# Patient Record
Sex: MALE | Race: WHITE | Employment: FULL TIME | URBAN - METROPOLITAN AREA
[De-identification: names, ages, dates, MRNs, and addresses within clinical notes are randomized per-mention and may not be internally consistent; named-entity substitution may affect disease eponyms.]

---

## 2020-06-11 ENCOUNTER — HOSPITAL ENCOUNTER (EMERGENCY)
Facility: CLINIC | Age: 65
Discharge: HOME | End: 2020-06-11
Attending: EMERGENCY MEDICINE | Admitting: EMERGENCY MEDICINE
Payer: COMMERCIAL

## 2020-06-11 ENCOUNTER — APPOINTMENT (OUTPATIENT)
Dept: CT IMAGING | Facility: CLINIC | Age: 65
End: 2020-06-11
Attending: EMERGENCY MEDICINE
Payer: COMMERCIAL

## 2020-06-11 VITALS
TEMPERATURE: 97.7 F | SYSTOLIC BLOOD PRESSURE: 138 MMHG | RESPIRATION RATE: 24 BRPM | WEIGHT: 266.87 LBS | DIASTOLIC BLOOD PRESSURE: 78 MMHG | OXYGEN SATURATION: 99 %

## 2020-06-11 DIAGNOSIS — N23 RENAL COLIC: ICD-10-CM

## 2020-06-11 DIAGNOSIS — K80.20 CALCULUS OF GALLBLADDER WITHOUT CHOLECYSTITIS WITHOUT OBSTRUCTION: ICD-10-CM

## 2020-06-11 LAB
ALBUMIN UR-MCNC: 30 MG/DL
ANION GAP SERPL CALCULATED.3IONS-SCNC: 11 MMOL/L (ref 3–14)
APPEARANCE UR: CLEAR
BACTERIA #/AREA URNS HPF: ABNORMAL /HPF
BILIRUB UR QL STRIP: NEGATIVE
BUN SERPL-MCNC: 23 MG/DL (ref 7–30)
CALCIUM SERPL-MCNC: 9.3 MG/DL (ref 8.5–10.1)
CAOX CRY #/AREA URNS HPF: ABNORMAL /HPF
CHLORIDE SERPL-SCNC: 108 MMOL/L (ref 94–109)
CO2 SERPL-SCNC: 20 MMOL/L (ref 20–32)
COLOR UR AUTO: YELLOW
CREAT SERPL-MCNC: 1.23 MG/DL (ref 0.66–1.25)
GFR SERPL CREATININE-BSD FRML MDRD: 62 ML/MIN/{1.73_M2}
GLUCOSE SERPL-MCNC: 124 MG/DL (ref 70–99)
GLUCOSE UR STRIP-MCNC: NEGATIVE MG/DL
HGB UR QL STRIP: ABNORMAL
KETONES UR STRIP-MCNC: NEGATIVE MG/DL
LEUKOCYTE ESTERASE UR QL STRIP: ABNORMAL
MUCOUS THREADS #/AREA URNS LPF: PRESENT /LPF
NITRATE UR QL: NEGATIVE
PH UR STRIP: 5.5 PH (ref 5–7)
POTASSIUM SERPL-SCNC: 4.1 MMOL/L (ref 3.4–5.3)
RBC #/AREA URNS AUTO: 31 /HPF (ref 0–2)
SODIUM SERPL-SCNC: 139 MMOL/L (ref 133–144)
SOURCE: ABNORMAL
SP GR UR STRIP: 1.03 (ref 1–1.03)
SQUAMOUS #/AREA URNS AUTO: <1 /HPF (ref 0–1)
UROBILINOGEN UR STRIP-MCNC: NORMAL MG/DL (ref 0–2)
WBC #/AREA URNS AUTO: 7 /HPF (ref 0–5)

## 2020-06-11 PROCEDURE — 81001 URINALYSIS AUTO W/SCOPE: CPT | Performed by: EMERGENCY MEDICINE

## 2020-06-11 PROCEDURE — 80048 BASIC METABOLIC PNL TOTAL CA: CPT | Performed by: EMERGENCY MEDICINE

## 2020-06-11 PROCEDURE — 25000128 H RX IP 250 OP 636: Performed by: EMERGENCY MEDICINE

## 2020-06-11 PROCEDURE — 99285 EMERGENCY DEPT VISIT HI MDM: CPT | Mod: 25

## 2020-06-11 PROCEDURE — 96375 TX/PRO/DX INJ NEW DRUG ADDON: CPT

## 2020-06-11 PROCEDURE — 96374 THER/PROPH/DIAG INJ IV PUSH: CPT

## 2020-06-11 PROCEDURE — 74176 CT ABD & PELVIS W/O CONTRAST: CPT

## 2020-06-11 PROCEDURE — 25000132 ZZH RX MED GY IP 250 OP 250 PS 637: Performed by: EMERGENCY MEDICINE

## 2020-06-11 RX ORDER — ONDANSETRON 4 MG/1
4 TABLET, ORALLY DISINTEGRATING ORAL EVERY 8 HOURS PRN
Qty: 10 TABLET | Refills: 0 | Status: SHIPPED | OUTPATIENT
Start: 2020-06-11 | End: 2020-06-15

## 2020-06-11 RX ORDER — TAMSULOSIN HYDROCHLORIDE 0.4 MG/1
0.4 CAPSULE ORAL DAILY
Qty: 10 CAPSULE | Refills: 0 | Status: SHIPPED | OUTPATIENT
Start: 2020-06-11 | End: 2020-06-21

## 2020-06-11 RX ORDER — METOCLOPRAMIDE HYDROCHLORIDE 5 MG/ML
10 INJECTION INTRAMUSCULAR; INTRAVENOUS EVERY 4 HOURS PRN
Status: DISCONTINUED | OUTPATIENT
Start: 2020-06-11 | End: 2020-06-11 | Stop reason: HOSPADM

## 2020-06-11 RX ORDER — HYDROCODONE BITARTRATE AND ACETAMINOPHEN 5; 325 MG/1; MG/1
1 TABLET ORAL EVERY 6 HOURS PRN
Qty: 10 TABLET | Refills: 0 | Status: ON HOLD | OUTPATIENT
Start: 2020-06-11 | End: 2020-06-14

## 2020-06-11 RX ORDER — TAMSULOSIN HYDROCHLORIDE 0.4 MG/1
0.4 CAPSULE ORAL ONCE
Status: COMPLETED | OUTPATIENT
Start: 2020-06-11 | End: 2020-06-11

## 2020-06-11 RX ORDER — KETOROLAC TROMETHAMINE 15 MG/ML
15 INJECTION, SOLUTION INTRAMUSCULAR; INTRAVENOUS ONCE
Status: COMPLETED | OUTPATIENT
Start: 2020-06-11 | End: 2020-06-11

## 2020-06-11 RX ADMIN — TAMSULOSIN HYDROCHLORIDE 0.4 MG: 0.4 CAPSULE ORAL at 11:44

## 2020-06-11 RX ADMIN — KETOROLAC TROMETHAMINE 15 MG: 15 INJECTION, SOLUTION INTRAMUSCULAR; INTRAVENOUS at 11:06

## 2020-06-11 RX ADMIN — METOCLOPRAMIDE HYDROCHLORIDE 10 MG: 5 INJECTION INTRAMUSCULAR; INTRAVENOUS at 11:06

## 2020-06-11 ASSESSMENT — ENCOUNTER SYMPTOMS
ABDOMINAL PAIN: 0
FEVER: 0
DIARRHEA: 0
VOMITING: 0
CHILLS: 0
CONSTIPATION: 0
NAUSEA: 1
DYSURIA: 0
HEMATURIA: 0

## 2020-06-11 NOTE — ED PROVIDER NOTES
"History     Chief Complaint:  Flank Pain    HPI  Sammy Pope is a 65 year old male who presents for evaluation of left flank pain. He reports intermittent pain here over the last week and now today in the last 30 minutes his pain significantly increased feeling like \"someone is sticking a knife in and twisting it\". When it worsened this morning he also felt pain in his left testicle which has continued. Currently his pain is down to a 3/10 after this 30 minute episode has ended. He feels better standing than sitting and notes a heat pad helps. He took Aleve this morning at 0700 with minimal pain relief. The patient also reports nausea but no vomiting. He denies any urinary urgency, frequency, hematuria, or dysuria. He also denies any fever, chills, or any changes in bowel movements. He has not been doing any new physical activities and denies trauma to the area. There is no family history of kidney stones.       Allergies:  No known drug allergies    Medications:    Lipitor     Past Medical History:    HLD    Past Surgical History:    History reviewed. No pertinent surgical history.    Family History:    History reviewed. No pertinent family history.     Social History:  The patient arrives alone      Review of Systems   Constitutional: Negative for chills and fever.   Gastrointestinal: Positive for nausea. Negative for abdominal pain, constipation, diarrhea and vomiting.   Genitourinary: Positive for testicular pain. Negative for dysuria and hematuria.   All other systems reviewed and are negative.      Physical Exam     Patient Vitals for the past 24 hrs:   BP Temp Temp src Heart Rate Resp SpO2 Weight   06/11/20 1039 (!) 197/91 97.7  F (36.5  C) Oral 62 24 97 % 121.1 kg (266 lb 13.9 oz)     Physical Exam  General: Alert, no acute distress; nontoxic appearing  HEENT:  Moist mucous membranes.  Conjunctiva normal.   CV:  RRR, no m/r/g, skin warm and well perfused  Pulm:  CTAB, no wheezes/ronchi/rales.  No acute " distress, breathing comfortably  GI:  Soft, nontender, nondistended.  No rebound or guarding.  Normal bowel sounds  :  Normal appearing external genitalia.  No scrotal swelling, no testicular pain/tenderness.  No hernia.  MSK:  Moving all extremities.  No focal areas of edema, erythema, or tenderness; no CVA tenderness  Skin:  WWP, no rashes, no lower extremity edema, skin color normal, no diaphoresis  Psych:  Well-appearing, normal affect, regular speech    Emergency Department Course     Imaging:  Radiology findings were communicated with the patient who voiced understanding of the findings.    CT Abdomen Pelvis wo contrast   IMPRESSION:   1.  3-4 mm proximal left ureteral calculus with mild left   hydronephrosis.   2.  Cholelithiasis  Reading per radiology    Laboratory:  Laboratory findings were communicated with the patient who voiced understanding of the findings.    BMP: Glucose 124 (H), o/w WNL (Creatinine: 1.23)    UA: blood small, protein albumin 30, leukocyte trace, WBC/HPF 7 (H), RBC/HPF 31 (H), bacteria few, mucus present, calcium oxalate few, o/w Negative    Interventions:  1106 Toradol, 15 mg, IV  1106 Reglan 10 mg IV  1144 Flomax 0.4 mg PO    Emergency Department Course:  Past medical records, nursing notes, and vitals reviewed.  1043: I performed an exam of the patient and obtained history, as documented above.     IV was inserted and blood was drawn for laboratory testing, results above.    The patient was sent for a CT while in the emergency department, findings above    The patient provided a urine sample here in the emergency department. This was sent for laboratory testing, findings above.    1215: I rechecked the patient. Explained findings to patient who states his pain is now down to a 1/10.    I personally reviewed the laboratory and imaging results with the Patient and answered all related questions prior to discharge.     Findings and plan explained to the Patient. Patient discharged  home with instructions regarding supportive care, medications, and reasons to return. The importance of close follow-up was reviewed.   Impression & Plan      Medical Decision Making:  Sammy Pope is a 65 year old male who presents with left flank pain. A broad differential diagnosis was considered including diverticulitis, ureterolithiasis, cholecystitis, aneurysm, dissection, volvulus, appendicitis, splenic issue, stomach pathology, ulcer, hydronephrosis, pneumonia, UTI, pyelonephritis amongst many other etiologies.   Signs and symptoms consistent with ureterolithiasis and renal colic. Confirmed on CT imaging with 3-4 mm stone.  UA not concerning for concomitant UTI.  Patients pain is controlled in ED and given flomax.  No signs of infected stone. There is the incidental finding of gallstones but no signs this is symptomatic at this time. Patient is hemodynamically stable in ED. Plan is home with the medicines below, follow up with urology as outpatient.  Return for fevers greater than 102, persistent anincreasing pain, other new symptoms develop.  Ureterolithiasis precautions for home.  Questions were answered.     Diagnosis:    ICD-10-CM    1. Renal colic  N23 UA with Microscopic     Basic metabolic panel (BMP)   2. Calculus of gallbladder without cholecystitis without obstruction  K80.20        Disposition:   discharged to home    Discharge Medications:     Medication List      Started    HYDROcodone-acetaminophen 5-325 MG tablet  Commonly known as:  NORCO  1 tablet, Oral, EVERY 6 HOURS PRN     ondansetron 4 MG ODT tab  Commonly known as:  Zofran ODT  4 mg, Oral, EVERY 8 HOURS PRN     tamsulosin 0.4 MG capsule  Commonly known as:  Flomax  0.4 mg, Oral, DAILY            Scribe Disclosure:  Jenny JEONG, am serving as a scribe at 10:43 AM on 6/11/2020 to document services personally performed by Felix Odonnell MD based on my observations and the provider's statements to me.    [facility]  [facility] EMERGENCY DEPARTMENT     [facility], Felix Acosta MD  06/11/20 0979

## 2020-06-11 NOTE — ED TRIAGE NOTES
Left flank pain that began intermittently or 1 week and worsening today.  Nauseated but no vomiting.

## 2020-06-11 NOTE — DISCHARGE INSTRUCTIONS
Return to the ER if you develop fever, worsening nausea/vomiting, worsening pain or any new concerning symptom.

## 2020-06-11 NOTE — ED AVS SNAPSHOT
[facility] Emergency Department  [address]  [facility] [address]  Phone:  [phone]  Fax:  [phone]                                    Sammy Pope   MRN: [id number]    Department:  [facility] Emergency Department   Date of Visit:  6/11/2020           After Visit Summary Signature Page    I have received my discharge instructions, and my questions have been answered. I have discussed any challenges I see with this plan with the nurse or doctor.    ..........................................................................................................................................  Patient/Patient Representative Signature      ..........................................................................................................................................  Patient Representative Print Name and Relationship to Patient    ..................................................               ................................................  Date                                   Time    ..........................................................................................................................................  Reviewed by Signature/Title    ...................................................              ..............................................  Date                                               Time          22EPIC Rev 08/18

## 2020-06-13 ENCOUNTER — HOSPITAL ENCOUNTER (OUTPATIENT)
Facility: CLINIC | Age: 65
Setting detail: OBSERVATION
Discharge: HOME | End: 2020-06-14
Attending: EMERGENCY MEDICINE | Admitting: INTERNAL MEDICINE
Payer: COMMERCIAL

## 2020-06-13 ENCOUNTER — APPOINTMENT (OUTPATIENT)
Dept: GENERAL RADIOLOGY | Facility: CLINIC | Age: 65
End: 2020-06-13
Attending: EMERGENCY MEDICINE
Payer: COMMERCIAL

## 2020-06-13 DIAGNOSIS — N17.9 ACUTE KIDNEY INJURY (H): ICD-10-CM

## 2020-06-13 DIAGNOSIS — N20.1 URETERAL STONE: Primary | ICD-10-CM

## 2020-06-13 PROBLEM — N23 RENAL COLIC: Status: ACTIVE | Noted: 2020-06-13

## 2020-06-13 LAB
ALBUMIN UR-MCNC: NEGATIVE MG/DL
ANION GAP SERPL CALCULATED.3IONS-SCNC: 10 MMOL/L (ref 3–14)
APPEARANCE UR: CLEAR
BASOPHILS # BLD AUTO: 0 10E9/L (ref 0–0.2)
BASOPHILS NFR BLD AUTO: 0.2 %
BILIRUB UR QL STRIP: NEGATIVE
BUN SERPL-MCNC: 20 MG/DL (ref 7–30)
CALCIUM SERPL-MCNC: 9.1 MG/DL (ref 8.5–10.1)
CHLORIDE SERPL-SCNC: 104 MMOL/L (ref 94–109)
CO2 SERPL-SCNC: 23 MMOL/L (ref 20–32)
COLOR UR AUTO: ABNORMAL
CREAT SERPL-MCNC: 1.85 MG/DL (ref 0.66–1.25)
DIFFERENTIAL METHOD BLD: ABNORMAL
EOSINOPHIL # BLD AUTO: 0 10E9/L (ref 0–0.7)
EOSINOPHIL NFR BLD AUTO: 0.5 %
ERYTHROCYTE [DISTWIDTH] IN BLOOD BY AUTOMATED COUNT: 12.1 % (ref 10–15)
GFR SERPL CREATININE-BSD FRML MDRD: 38 ML/MIN/{1.73_M2}
GLUCOSE SERPL-MCNC: 103 MG/DL (ref 70–99)
GLUCOSE UR STRIP-MCNC: NEGATIVE MG/DL
HCT VFR BLD AUTO: 39.3 % (ref 40–53)
HGB BLD-MCNC: 13 G/DL (ref 13.3–17.7)
HGB UR QL STRIP: NEGATIVE
IMM GRANULOCYTES # BLD: 0 10E9/L (ref 0–0.4)
IMM GRANULOCYTES NFR BLD: 0.2 %
KETONES UR STRIP-MCNC: NEGATIVE MG/DL
LEUKOCYTE ESTERASE UR QL STRIP: NEGATIVE
LYMPHOCYTES # BLD AUTO: 1.2 10E9/L (ref 0.8–5.3)
LYMPHOCYTES NFR BLD AUTO: 13.2 %
MCH RBC QN AUTO: 29.4 PG (ref 26.5–33)
MCHC RBC AUTO-ENTMCNC: 33.1 G/DL (ref 31.5–36.5)
MCV RBC AUTO: 89 FL (ref 78–100)
MONOCYTES # BLD AUTO: 0.9 10E9/L (ref 0–1.3)
MONOCYTES NFR BLD AUTO: 9.7 %
MUCOUS THREADS #/AREA URNS LPF: PRESENT /LPF
NEUTROPHILS # BLD AUTO: 6.7 10E9/L (ref 1.6–8.3)
NEUTROPHILS NFR BLD AUTO: 76.2 %
NITRATE UR QL: NEGATIVE
NRBC # BLD AUTO: 0 10*3/UL
NRBC BLD AUTO-RTO: 0 /100
PH UR STRIP: 5.5 PH (ref 5–7)
PLATELET # BLD AUTO: 203 10E9/L (ref 150–450)
POTASSIUM SERPL-SCNC: 4 MMOL/L (ref 3.4–5.3)
RBC # BLD AUTO: 4.42 10E12/L (ref 4.4–5.9)
RBC #/AREA URNS AUTO: 0 /HPF (ref 0–2)
SARS-COV-2 PCR COMMENT: NORMAL
SARS-COV-2 RNA SPEC QL NAA+PROBE: NEGATIVE
SARS-COV-2 RNA SPEC QL NAA+PROBE: NORMAL
SODIUM SERPL-SCNC: 137 MMOL/L (ref 133–144)
SOURCE: ABNORMAL
SP GR UR STRIP: 1.02 (ref 1–1.03)
SPECIMEN SOURCE: NORMAL
SPECIMEN SOURCE: NORMAL
UROBILINOGEN UR STRIP-MCNC: NORMAL MG/DL (ref 0–2)
WBC # BLD AUTO: 8.8 10E9/L (ref 4–11)
WBC #/AREA URNS AUTO: 2 /HPF (ref 0–5)

## 2020-06-13 PROCEDURE — 96361 HYDRATE IV INFUSION ADD-ON: CPT

## 2020-06-13 PROCEDURE — 96375 TX/PRO/DX INJ NEW DRUG ADDON: CPT

## 2020-06-13 PROCEDURE — 74019 RADEX ABDOMEN 2 VIEWS: CPT

## 2020-06-13 PROCEDURE — 80048 BASIC METABOLIC PNL TOTAL CA: CPT | Performed by: EMERGENCY MEDICINE

## 2020-06-13 PROCEDURE — 99220 ZZC INITIAL OBSERVATION CARE,LEVL III: CPT | Performed by: PHYSICIAN ASSISTANT

## 2020-06-13 PROCEDURE — 96374 THER/PROPH/DIAG INJ IV PUSH: CPT

## 2020-06-13 PROCEDURE — 25000128 H RX IP 250 OP 636: Performed by: EMERGENCY MEDICINE

## 2020-06-13 PROCEDURE — 96376 TX/PRO/DX INJ SAME DRUG ADON: CPT

## 2020-06-13 PROCEDURE — 81001 URINALYSIS AUTO W/SCOPE: CPT | Performed by: EMERGENCY MEDICINE

## 2020-06-13 PROCEDURE — 25000128 H RX IP 250 OP 636

## 2020-06-13 PROCEDURE — 25800030 ZZH RX IP 258 OP 636: Performed by: PHYSICIAN ASSISTANT

## 2020-06-13 PROCEDURE — C9803 HOPD COVID-19 SPEC COLLECT: HCPCS

## 2020-06-13 PROCEDURE — G0378 HOSPITAL OBSERVATION PER HR: HCPCS

## 2020-06-13 PROCEDURE — U0003 INFECTIOUS AGENT DETECTION BY NUCLEIC ACID (DNA OR RNA); SEVERE ACUTE RESPIRATORY SYNDROME CORONAVIRUS 2 (SARS-COV-2) (CORONAVIRUS DISEASE [COVID-19]), AMPLIFIED PROBE TECHNIQUE, MAKING USE OF HIGH THROUGHPUT TECHNOLOGIES AS DESCRIBED BY CMS-2020-01-R: HCPCS | Performed by: EMERGENCY MEDICINE

## 2020-06-13 PROCEDURE — 25000132 ZZH RX MED GY IP 250 OP 250 PS 637: Performed by: PHYSICIAN ASSISTANT

## 2020-06-13 PROCEDURE — 74420 UROGRAPHY RTRGR +-KUB: CPT | Mod: 26 | Performed by: UROLOGY

## 2020-06-13 PROCEDURE — 99285 EMERGENCY DEPT VISIT HI MDM: CPT | Mod: 25

## 2020-06-13 PROCEDURE — 25800030 ZZH RX IP 258 OP 636: Performed by: EMERGENCY MEDICINE

## 2020-06-13 PROCEDURE — 52356 CYSTO/URETERO W/LITHOTRIPSY: CPT | Mod: LT | Performed by: UROLOGY

## 2020-06-13 PROCEDURE — 85025 COMPLETE CBC W/AUTO DIFF WBC: CPT | Performed by: EMERGENCY MEDICINE

## 2020-06-13 RX ORDER — ACETAMINOPHEN 325 MG/1
650 TABLET ORAL EVERY 4 HOURS PRN
Status: DISCONTINUED | OUTPATIENT
Start: 2020-06-13 | End: 2020-06-14 | Stop reason: HOSPADM

## 2020-06-13 RX ORDER — HYDROMORPHONE HYDROCHLORIDE 1 MG/ML
0.5 INJECTION, SOLUTION INTRAMUSCULAR; INTRAVENOUS; SUBCUTANEOUS ONCE
Status: COMPLETED | OUTPATIENT
Start: 2020-06-13 | End: 2020-06-13

## 2020-06-13 RX ORDER — LIDOCAINE 40 MG/G
CREAM TOPICAL
Status: DISCONTINUED | OUTPATIENT
Start: 2020-06-13 | End: 2020-06-14 | Stop reason: HOSPADM

## 2020-06-13 RX ORDER — HYDROMORPHONE HYDROCHLORIDE 1 MG/ML
INJECTION, SOLUTION INTRAMUSCULAR; INTRAVENOUS; SUBCUTANEOUS
Status: COMPLETED
Start: 2020-06-13 | End: 2020-06-13

## 2020-06-13 RX ORDER — NALOXONE HYDROCHLORIDE 0.4 MG/ML
.1-.4 INJECTION, SOLUTION INTRAMUSCULAR; INTRAVENOUS; SUBCUTANEOUS
Status: DISCONTINUED | OUTPATIENT
Start: 2020-06-13 | End: 2020-06-14 | Stop reason: HOSPADM

## 2020-06-13 RX ORDER — HYDROMORPHONE HYDROCHLORIDE 1 MG/ML
0.5 INJECTION, SOLUTION INTRAMUSCULAR; INTRAVENOUS; SUBCUTANEOUS
Status: DISCONTINUED | OUTPATIENT
Start: 2020-06-13 | End: 2020-06-14 | Stop reason: HOSPADM

## 2020-06-13 RX ORDER — ATORVASTATIN CALCIUM 20 MG/1
20 TABLET, FILM COATED ORAL DAILY
COMMUNITY

## 2020-06-13 RX ORDER — MORPHINE SULFATE 4 MG/ML
4 INJECTION, SOLUTION INTRAMUSCULAR; INTRAVENOUS
Status: DISCONTINUED | OUTPATIENT
Start: 2020-06-13 | End: 2020-06-13

## 2020-06-13 RX ORDER — AMOXICILLIN 250 MG
1 CAPSULE ORAL 2 TIMES DAILY PRN
Status: DISCONTINUED | OUTPATIENT
Start: 2020-06-13 | End: 2020-06-14 | Stop reason: HOSPADM

## 2020-06-13 RX ORDER — TAMSULOSIN HYDROCHLORIDE 0.4 MG/1
0.4 CAPSULE ORAL DAILY
Status: DISCONTINUED | OUTPATIENT
Start: 2020-06-14 | End: 2020-06-14 | Stop reason: HOSPADM

## 2020-06-13 RX ORDER — ONDANSETRON 2 MG/ML
4 INJECTION INTRAMUSCULAR; INTRAVENOUS EVERY 30 MIN PRN
Status: DISCONTINUED | OUTPATIENT
Start: 2020-06-13 | End: 2020-06-13

## 2020-06-13 RX ORDER — HYDROMORPHONE HYDROCHLORIDE 1 MG/ML
0.5 INJECTION, SOLUTION INTRAMUSCULAR; INTRAVENOUS; SUBCUTANEOUS
Status: COMPLETED | OUTPATIENT
Start: 2020-06-13 | End: 2020-06-13

## 2020-06-13 RX ORDER — ONDANSETRON 4 MG/1
4 TABLET, ORALLY DISINTEGRATING ORAL EVERY 6 HOURS PRN
Status: DISCONTINUED | OUTPATIENT
Start: 2020-06-13 | End: 2020-06-14 | Stop reason: HOSPADM

## 2020-06-13 RX ORDER — SODIUM CHLORIDE 9 MG/ML
INJECTION, SOLUTION INTRAVENOUS CONTINUOUS
Status: DISCONTINUED | OUTPATIENT
Start: 2020-06-13 | End: 2020-06-14 | Stop reason: HOSPADM

## 2020-06-13 RX ORDER — CHLORAL HYDRATE 500 MG
1 CAPSULE ORAL 3 TIMES DAILY
COMMUNITY

## 2020-06-13 RX ORDER — ONDANSETRON 2 MG/ML
4 INJECTION INTRAMUSCULAR; INTRAVENOUS EVERY 6 HOURS PRN
Status: DISCONTINUED | OUTPATIENT
Start: 2020-06-13 | End: 2020-06-14 | Stop reason: HOSPADM

## 2020-06-13 RX ORDER — OXYCODONE HYDROCHLORIDE 5 MG/1
5-10 TABLET ORAL
Status: DISCONTINUED | OUTPATIENT
Start: 2020-06-13 | End: 2020-06-14 | Stop reason: HOSPADM

## 2020-06-13 RX ORDER — POLYETHYLENE GLYCOL 3350 17 G/17G
17 POWDER, FOR SOLUTION ORAL DAILY PRN
Status: DISCONTINUED | OUTPATIENT
Start: 2020-06-13 | End: 2020-06-14 | Stop reason: HOSPADM

## 2020-06-13 RX ORDER — AMOXICILLIN 250 MG
2 CAPSULE ORAL 2 TIMES DAILY PRN
Status: DISCONTINUED | OUTPATIENT
Start: 2020-06-13 | End: 2020-06-14 | Stop reason: HOSPADM

## 2020-06-13 RX ADMIN — ONDANSETRON 4 MG: 2 INJECTION INTRAMUSCULAR; INTRAVENOUS at 17:59

## 2020-06-13 RX ADMIN — MORPHINE SULFATE 4 MG: 4 INJECTION INTRAVENOUS at 17:59

## 2020-06-13 RX ADMIN — HYDROMORPHONE HYDROCHLORIDE 0.5 MG: 1 INJECTION, SOLUTION INTRAMUSCULAR; INTRAVENOUS; SUBCUTANEOUS at 20:25

## 2020-06-13 RX ADMIN — HYDROMORPHONE HYDROCHLORIDE 0.5 MG: 1 INJECTION, SOLUTION INTRAMUSCULAR; INTRAVENOUS; SUBCUTANEOUS at 19:47

## 2020-06-13 RX ADMIN — OXYCODONE HYDROCHLORIDE 5 MG: 5 TABLET ORAL at 23:36

## 2020-06-13 RX ADMIN — SODIUM CHLORIDE: 9 INJECTION, SOLUTION INTRAVENOUS at 21:53

## 2020-06-13 RX ADMIN — HYDROMORPHONE HYDROCHLORIDE 0.5 MG: 1 INJECTION, SOLUTION INTRAMUSCULAR; INTRAVENOUS; SUBCUTANEOUS at 18:29

## 2020-06-13 RX ADMIN — SODIUM CHLORIDE 1000 ML: 9 INJECTION, SOLUTION INTRAVENOUS at 17:59

## 2020-06-13 ASSESSMENT — ENCOUNTER SYMPTOMS
CARDIOVASCULAR NEGATIVE: 1
RESPIRATORY NEGATIVE: 1
FLANK PAIN: 1
GASTROINTESTINAL NEGATIVE: 1
DIFFICULTY URINATING: 0
CONSTITUTIONAL NEGATIVE: 1

## 2020-06-13 ASSESSMENT — MIFFLIN-ST. JEOR: SCORE: 2047.85

## 2020-06-13 NOTE — ED TRIAGE NOTES
Here for kidney stone on Thursday, went home with pain meds. Pain has gotten worse, and now ran out of pain meds. ABCs intact. A&Ox3

## 2020-06-13 NOTE — ED PROVIDER NOTES
"  History   Chief Complaint  Flank Pain    The history is provided by the patient.      Sammy Pope is a 65 year old male with a history of HLD who presents for evaluation of left flank pain consistent with the Kidney stone he was found to have two days ago here in the ED where he was dicharged with norco. He reports today that the pain has gotten worse and is out of pain medication. He thinks the pain has started to migrate slightly. He states that he tried to treat his pain with ibuprofen or Tylenol, however neither of these alleviated his pain. He denies fever or vomiting. He rates his pain 8-9/10. He denies difficulties urinating.    Allergies  No Known Drug allergies    Medications  Lipitor    Past Medical History  HLD    Past Surgical History  The patient does not have any pertinent past surgical history.    Family History  No past pertinent family history.    Social History  The patient denies tobacco or alcohol use.      Review of Systems   Constitutional: Negative.    HENT: Negative.    Respiratory: Negative.    Cardiovascular: Negative.    Gastrointestinal: Negative.    Genitourinary: Positive for flank pain. Negative for difficulty urinating.   10 point review of systems was obtained and negative other than mentioned above.    Physical Exam     Patient Vitals for the past 24 hrs:   BP Temp Temp src Pulse Resp SpO2 Height Weight   06/13/20 2156 (!) 154/75 -- -- 74 -- -- -- --   06/13/20 2114 (!) 182/80 97.4  F (36.3  C) Oral 78 20 91 % 1.829 m (6' 0.01\") 122 kg (268 lb 14.4 oz)   06/13/20 2031 -- -- -- -- -- 97 % -- --   06/13/20 1916 -- -- -- -- -- 97 % -- --   06/13/20 1915 -- -- -- -- -- 95 % -- --   06/13/20 1900 -- -- -- 67 -- 93 % -- --   06/13/20 1845 -- -- -- -- -- 92 % -- --   06/13/20 1830 -- -- -- -- -- 97 % -- --   06/13/20 1815 -- -- -- -- -- 99 % -- --   06/13/20 1800 (!) 164/77 -- -- 64 -- 94 % -- --   06/13/20 1730 (!) 169/92 97.5  F (36.4  C) -- 100 26 97 % -- --       Physical " Exam    General: Appears uncomfortable sitting in bed  Eyes:  The pupils are equal and round    Conjunctivae and sclerae are normal  ENT:    Moist mucous membranes  Neck:  Normal range of motion  CV:  Regular rate     Skin warm and well perfused   Resp:  Non labored breathing on room air  GI:  Abdomen is soft, there is no rigidity    No distension    No rebound tenderness     No abdominal tenderness  MS:  Normal muscular tone  Skin:  No rash or acute skin lesions noted  Neuro:   Awake, alert.      Speech is normal and fluent.    Face is symmetric.     Moves all extremities equally  Psych: Normal affect.  Appropriate interactions.    Emergency Department Course   Imaging:  Radiology findings were communicated with the patient who voiced understanding of the findings.    KUB XR  FIDNINGS: A proximal left ureteral stone is not visualized. There are several calcifications within the pelvis most of which represent phleboliths. A 2 mm calcification lower left pelvis could represent migration of the previously detected left ureteral stone. Moderate stool throughout the colon, bowel gas pattern otherwise unremarkable. Small bone island left femoral head    Laboratory:  Laboratory findings were communicated with the patient who voiced understanding of the findings.    CBC: WBC: 8.8, HGB: 13.0 (low), PLT: 203  BMP: Glucose 103 (high), GFR Estimate: 38 (low), o/w WNL (Creatinine: 1.85 (high))    UA with Microscopic: Mucous: Present (A), o/w WNL    COVID-19 Virus: pending    Interventions:  1759 NS 1L IV  1759 Zofran 4 mg IV  1829 Dilaudid 0.5 mg IV  1947 Dilaudid 0.5 mg IV  2025 Dilaudid 0.5 mg IV    Emergency Department Course:  Past medical records, nursing notes, and vitals reviewed.    1815 I physically examined the patient as documented above.    A Nasopharyngeal swab was obtained here in the ED.  IV was inserted and blood was drawn for laboratory testing, results above.  The patient provided a urine sample here in the  emergency department. This was sent for laboratory testing, findings above.  The patient was sent for radiographs while in the emergency department, results above.     1950 I rechecked the patient and discussed the findings of their workup thus far including plans for admission.     Findings and plan explained to the Patient who consents to admission. Discussed the patient with Dr. Yen, who will admit the patient to an observation bed for further monitoring, evaluation, and treatment.    I personally reviewed the laboratory and imaging results with the Patient and answered all related questions prior to admission.       Impression & Plan   Medical Decision Making:  Sammy Pope is a 65 year old male who presented to the ED with flank pain. Patient with flank pain with recent CT abdomen that showed left proximal ureteral stone. Continues to have intermittent pain since discharge from ED two days ago. Appears uncomfortable in ED. Creatinine is increasing from two days ago. No evidence of infection or fever. KUB done that showed possible stone that has migrated. Symptoms consistent with stone and did not think repeat CT indicated. Pain not controlled so will admit for pain control, urology consult.    Diagnosis:    ICD-10-CM    1. Ureteral stone  N20.1 Asymptomatic COVID-19 Virus (Coronavirus) by PCR     Case Request: CYSTOURETEROSCOPY, WITH RETROGRADE PYELOGRAM, HOLMIUM LASER LITHOTRIPSY OF URETERAL CALCULUS, AND STENT INSERTION     Case Request: CYSTOURETEROSCOPY, WITH RETROGRADE PYELOGRAM, HOLMIUM LASER LITHOTRIPSY OF URETERAL CALCULUS, AND STENT INSERTION   2. Acute kidney injury (H)  N17.9 Case Request: CYSTOURETEROSCOPY, WITH RETROGRADE PYELOGRAM, HOLMIUM LASER LITHOTRIPSY OF URETERAL CALCULUS, AND STENT INSERTION     Case Request: CYSTOURETEROSCOPY, WITH RETROGRADE PYELOGRAM, HOLMIUM LASER LITHOTRIPSY OF URETERAL CALCULUS, AND STENT INSERTION       Disposition:  Admitted to observation.    Scribe  Disclosure:  I, Jaswant Martin, am serving as a scribe at 6:14 PM on 6/13/2020 to document services personally performed by Lashae Menon MD based on my observations and the provider's statements to me.      Lashae Menon MD  06/14/20 0137

## 2020-06-14 ENCOUNTER — APPOINTMENT (OUTPATIENT)
Dept: GENERAL RADIOLOGY | Facility: CLINIC | Age: 65
End: 2020-06-14
Attending: INTERNAL MEDICINE
Payer: COMMERCIAL

## 2020-06-14 ENCOUNTER — ANESTHESIA (OUTPATIENT)
Dept: SURGERY | Facility: CLINIC | Age: 65
End: 2020-06-14
Payer: COMMERCIAL

## 2020-06-14 ENCOUNTER — ANESTHESIA EVENT (OUTPATIENT)
Dept: SURGERY | Facility: CLINIC | Age: 65
End: 2020-06-14
Payer: COMMERCIAL

## 2020-06-14 VITALS
DIASTOLIC BLOOD PRESSURE: 80 MMHG | SYSTOLIC BLOOD PRESSURE: 151 MMHG | OXYGEN SATURATION: 92 % | WEIGHT: 268.9 LBS | BODY MASS INDEX: 36.42 KG/M2 | RESPIRATION RATE: 16 BRPM | TEMPERATURE: 97.3 F | HEART RATE: 55 BPM | HEIGHT: 72 IN

## 2020-06-14 LAB
ANION GAP SERPL CALCULATED.3IONS-SCNC: 7 MMOL/L (ref 3–14)
BASOPHILS # BLD AUTO: 0 10E9/L (ref 0–0.2)
BASOPHILS NFR BLD AUTO: 0.2 %
BUN SERPL-MCNC: 19 MG/DL (ref 7–30)
CALCIUM SERPL-MCNC: 8.2 MG/DL (ref 8.5–10.1)
CHLORIDE SERPL-SCNC: 107 MMOL/L (ref 94–109)
CO2 SERPL-SCNC: 25 MMOL/L (ref 20–32)
CREAT SERPL-MCNC: 1.7 MG/DL (ref 0.66–1.25)
DIFFERENTIAL METHOD BLD: ABNORMAL
EOSINOPHIL # BLD AUTO: 0 10E9/L (ref 0–0.7)
EOSINOPHIL NFR BLD AUTO: 0.5 %
ERYTHROCYTE [DISTWIDTH] IN BLOOD BY AUTOMATED COUNT: 12.2 % (ref 10–15)
GFR SERPL CREATININE-BSD FRML MDRD: 42 ML/MIN/{1.73_M2}
GLUCOSE SERPL-MCNC: 94 MG/DL (ref 70–99)
HCT VFR BLD AUTO: 36.6 % (ref 40–53)
HGB BLD-MCNC: 11.9 G/DL (ref 13.3–17.7)
IMM GRANULOCYTES # BLD: 0 10E9/L (ref 0–0.4)
IMM GRANULOCYTES NFR BLD: 0.3 %
LYMPHOCYTES # BLD AUTO: 0.8 10E9/L (ref 0.8–5.3)
LYMPHOCYTES NFR BLD AUTO: 8.7 %
MCH RBC QN AUTO: 29.8 PG (ref 26.5–33)
MCHC RBC AUTO-ENTMCNC: 32.5 G/DL (ref 31.5–36.5)
MCV RBC AUTO: 92 FL (ref 78–100)
MONOCYTES # BLD AUTO: 0.9 10E9/L (ref 0–1.3)
MONOCYTES NFR BLD AUTO: 10.3 %
NEUTROPHILS # BLD AUTO: 7.1 10E9/L (ref 1.6–8.3)
NEUTROPHILS NFR BLD AUTO: 80 %
NRBC # BLD AUTO: 0 10*3/UL
NRBC BLD AUTO-RTO: 0 /100
PLATELET # BLD AUTO: 158 10E9/L (ref 150–450)
POTASSIUM SERPL-SCNC: 3.9 MMOL/L (ref 3.4–5.3)
RBC # BLD AUTO: 3.99 10E12/L (ref 4.4–5.9)
SODIUM SERPL-SCNC: 139 MMOL/L (ref 133–144)
WBC # BLD AUTO: 8.8 10E9/L (ref 4–11)

## 2020-06-14 PROCEDURE — 25000128 H RX IP 250 OP 636: Performed by: UROLOGY

## 2020-06-14 PROCEDURE — 40000277 XR SURGERY CARM FLUORO LESS THAN 5 MIN W STILLS: Mod: TC

## 2020-06-14 PROCEDURE — 85025 COMPLETE CBC W/AUTO DIFF WBC: CPT | Performed by: PHYSICIAN ASSISTANT

## 2020-06-14 PROCEDURE — 25000125 ZZHC RX 250: Performed by: ANESTHESIOLOGY

## 2020-06-14 PROCEDURE — 27211024 ZZHC OR SUPPLY OTHER OPNP: Performed by: UROLOGY

## 2020-06-14 PROCEDURE — 25000128 H RX IP 250 OP 636: Performed by: ANESTHESIOLOGY

## 2020-06-14 PROCEDURE — 25000125 ZZHC RX 250: Performed by: UROLOGY

## 2020-06-14 PROCEDURE — 25000128 H RX IP 250 OP 636: Performed by: NURSE ANESTHETIST, CERTIFIED REGISTERED

## 2020-06-14 PROCEDURE — 27210794 ZZH OR GENERAL SUPPLY STERILE: Performed by: UROLOGY

## 2020-06-14 PROCEDURE — 36000058 ZZH SURGERY LEVEL 3 EA 15 ADDTL MIN: Performed by: UROLOGY

## 2020-06-14 PROCEDURE — 40000306 ZZH STATISTIC PRE PROC ASSESS II: Performed by: UROLOGY

## 2020-06-14 PROCEDURE — 96376 TX/PRO/DX INJ SAME DRUG ADON: CPT

## 2020-06-14 PROCEDURE — 37000009 ZZH ANESTHESIA TECHNICAL FEE, EACH ADDTL 15 MIN: Performed by: UROLOGY

## 2020-06-14 PROCEDURE — 93005 ELECTROCARDIOGRAM TRACING: CPT | Mod: 59

## 2020-06-14 PROCEDURE — 37000008 ZZH ANESTHESIA TECHNICAL FEE, 1ST 30 MIN: Performed by: UROLOGY

## 2020-06-14 PROCEDURE — G0378 HOSPITAL OBSERVATION PER HR: HCPCS

## 2020-06-14 PROCEDURE — 88300 SURGICAL PATH GROSS: CPT | Mod: 26 | Performed by: INTERNAL MEDICINE

## 2020-06-14 PROCEDURE — 25000128 H RX IP 250 OP 636: Performed by: PHYSICIAN ASSISTANT

## 2020-06-14 PROCEDURE — 25000132 ZZH RX MED GY IP 250 OP 250 PS 637: Performed by: PHYSICIAN ASSISTANT

## 2020-06-14 PROCEDURE — 88300 SURGICAL PATH GROSS: CPT | Performed by: INTERNAL MEDICINE

## 2020-06-14 PROCEDURE — 36415 COLL VENOUS BLD VENIPUNCTURE: CPT | Performed by: PHYSICIAN ASSISTANT

## 2020-06-14 PROCEDURE — 25500064 ZZH RX 255 OP 636: Performed by: UROLOGY

## 2020-06-14 PROCEDURE — 82365 CALCULUS SPECTROSCOPY: CPT | Performed by: UROLOGY

## 2020-06-14 PROCEDURE — 71000027 ZZH RECOVERY PHASE 2 EACH 15 MINS: Performed by: UROLOGY

## 2020-06-14 PROCEDURE — 80048 BASIC METABOLIC PNL TOTAL CA: CPT | Performed by: PHYSICIAN ASSISTANT

## 2020-06-14 PROCEDURE — 25800030 ZZH RX IP 258 OP 636: Performed by: PHYSICIAN ASSISTANT

## 2020-06-14 PROCEDURE — C2617 STENT, NON-COR, TEM W/O DEL: HCPCS | Performed by: UROLOGY

## 2020-06-14 PROCEDURE — 71000012 ZZH RECOVERY PHASE 1 LEVEL 1 FIRST HR: Performed by: UROLOGY

## 2020-06-14 PROCEDURE — 99217 ZZC OBSERVATION CARE DISCHARGE: CPT | Performed by: PHYSICIAN ASSISTANT

## 2020-06-14 PROCEDURE — 25800030 ZZH RX IP 258 OP 636: Performed by: ANESTHESIOLOGY

## 2020-06-14 PROCEDURE — 25000125 ZZHC RX 250: Performed by: NURSE ANESTHETIST, CERTIFIED REGISTERED

## 2020-06-14 PROCEDURE — 99204 OFFICE O/P NEW MOD 45 MIN: CPT | Mod: 25 | Performed by: UROLOGY

## 2020-06-14 PROCEDURE — C1769 GUIDE WIRE: HCPCS | Performed by: UROLOGY

## 2020-06-14 PROCEDURE — 36000060 ZZH SURGERY LEVEL 3 W FLUORO 1ST 30 MIN: Performed by: UROLOGY

## 2020-06-14 PROCEDURE — 25800025 ZZH RX 258: Performed by: UROLOGY

## 2020-06-14 DEVICE — STENT URETERAL POLARIS ULTRA 6FRX26CM M0061921330: Type: IMPLANTABLE DEVICE | Site: ABDOMEN | Status: FUNCTIONAL

## 2020-06-14 RX ORDER — ALBUTEROL SULFATE 0.83 MG/ML
2.5 SOLUTION RESPIRATORY (INHALATION) EVERY 4 HOURS PRN
Status: DISCONTINUED | OUTPATIENT
Start: 2020-06-14 | End: 2020-06-14 | Stop reason: HOSPADM

## 2020-06-14 RX ORDER — OXYCODONE HYDROCHLORIDE 5 MG/1
5-10 TABLET ORAL EVERY 4 HOURS PRN
Qty: 10 TABLET | Refills: 0 | Status: SHIPPED | OUTPATIENT
Start: 2020-06-14

## 2020-06-14 RX ORDER — FENTANYL CITRATE 50 UG/ML
25-50 INJECTION, SOLUTION INTRAMUSCULAR; INTRAVENOUS
Status: CANCELLED | OUTPATIENT
Start: 2020-06-14

## 2020-06-14 RX ORDER — SCOLOPAMINE TRANSDERMAL SYSTEM 1 MG/1
1 PATCH, EXTENDED RELEASE TRANSDERMAL ONCE
Status: DISCONTINUED | OUTPATIENT
Start: 2020-06-14 | End: 2020-06-14 | Stop reason: HOSPADM

## 2020-06-14 RX ORDER — PROPOFOL 10 MG/ML
INJECTION, EMULSION INTRAVENOUS CONTINUOUS PRN
Status: DISCONTINUED | OUTPATIENT
Start: 2020-06-14 | End: 2020-06-14

## 2020-06-14 RX ORDER — HYDROMORPHONE HYDROCHLORIDE 1 MG/ML
.3-.5 INJECTION, SOLUTION INTRAMUSCULAR; INTRAVENOUS; SUBCUTANEOUS EVERY 10 MIN PRN
Status: DISCONTINUED | OUTPATIENT
Start: 2020-06-14 | End: 2020-06-14 | Stop reason: HOSPADM

## 2020-06-14 RX ORDER — FENTANYL CITRATE 50 UG/ML
25-50 INJECTION, SOLUTION INTRAMUSCULAR; INTRAVENOUS EVERY 5 MIN PRN
Status: DISCONTINUED | OUTPATIENT
Start: 2020-06-14 | End: 2020-06-14 | Stop reason: HOSPADM

## 2020-06-14 RX ORDER — ONDANSETRON 4 MG/1
4 TABLET, ORALLY DISINTEGRATING ORAL EVERY 30 MIN PRN
Status: DISCONTINUED | OUTPATIENT
Start: 2020-06-14 | End: 2020-06-14 | Stop reason: HOSPADM

## 2020-06-14 RX ORDER — MEPERIDINE HYDROCHLORIDE 50 MG/ML
12.5 INJECTION INTRAMUSCULAR; INTRAVENOUS; SUBCUTANEOUS
Status: DISCONTINUED | OUTPATIENT
Start: 2020-06-14 | End: 2020-06-14 | Stop reason: HOSPADM

## 2020-06-14 RX ORDER — CEFAZOLIN SODIUM 1 G/3ML
1 INJECTION, POWDER, FOR SOLUTION INTRAMUSCULAR; INTRAVENOUS SEE ADMIN INSTRUCTIONS
Status: DISCONTINUED | OUTPATIENT
Start: 2020-06-14 | End: 2020-06-14 | Stop reason: HOSPADM

## 2020-06-14 RX ORDER — AMOXICILLIN 250 MG
1-2 CAPSULE ORAL 2 TIMES DAILY PRN
Qty: 20 TABLET | Refills: 0 | Status: SHIPPED | OUTPATIENT
Start: 2020-06-14

## 2020-06-14 RX ORDER — DIPHENHYDRAMINE HYDROCHLORIDE 50 MG/ML
INJECTION INTRAMUSCULAR; INTRAVENOUS PRN
Status: DISCONTINUED | OUTPATIENT
Start: 2020-06-14 | End: 2020-06-14

## 2020-06-14 RX ORDER — DEXAMETHASONE SODIUM PHOSPHATE 4 MG/ML
INJECTION, SOLUTION INTRA-ARTICULAR; INTRALESIONAL; INTRAMUSCULAR; INTRAVENOUS; SOFT TISSUE PRN
Status: DISCONTINUED | OUTPATIENT
Start: 2020-06-14 | End: 2020-06-14

## 2020-06-14 RX ORDER — ONDANSETRON 2 MG/ML
4 INJECTION INTRAMUSCULAR; INTRAVENOUS EVERY 30 MIN PRN
Status: DISCONTINUED | OUTPATIENT
Start: 2020-06-14 | End: 2020-06-14 | Stop reason: HOSPADM

## 2020-06-14 RX ORDER — LIDOCAINE 40 MG/G
CREAM TOPICAL
Status: DISCONTINUED | OUTPATIENT
Start: 2020-06-14 | End: 2020-06-14 | Stop reason: HOSPADM

## 2020-06-14 RX ORDER — PROPOFOL 10 MG/ML
INJECTION, EMULSION INTRAVENOUS PRN
Status: DISCONTINUED | OUTPATIENT
Start: 2020-06-14 | End: 2020-06-14

## 2020-06-14 RX ORDER — SODIUM CHLORIDE, SODIUM LACTATE, POTASSIUM CHLORIDE, CALCIUM CHLORIDE 600; 310; 30; 20 MG/100ML; MG/100ML; MG/100ML; MG/100ML
INJECTION, SOLUTION INTRAVENOUS CONTINUOUS
Status: DISCONTINUED | OUTPATIENT
Start: 2020-06-14 | End: 2020-06-14 | Stop reason: HOSPADM

## 2020-06-14 RX ORDER — CEFAZOLIN SODIUM IN 0.9 % NACL 3 G/100 ML
3 INTRAVENOUS SOLUTION, PIGGYBACK (ML) INTRAVENOUS
Status: COMPLETED | OUTPATIENT
Start: 2020-06-14 | End: 2020-06-14

## 2020-06-14 RX ORDER — NALOXONE HYDROCHLORIDE 0.4 MG/ML
.1-.4 INJECTION, SOLUTION INTRAMUSCULAR; INTRAVENOUS; SUBCUTANEOUS
Status: DISCONTINUED | OUTPATIENT
Start: 2020-06-14 | End: 2020-06-14 | Stop reason: HOSPADM

## 2020-06-14 RX ORDER — FENTANYL CITRATE 50 UG/ML
INJECTION, SOLUTION INTRAMUSCULAR; INTRAVENOUS PRN
Status: DISCONTINUED | OUTPATIENT
Start: 2020-06-14 | End: 2020-06-14

## 2020-06-14 RX ORDER — ONDANSETRON 2 MG/ML
4 INJECTION INTRAMUSCULAR; INTRAVENOUS ONCE
Status: COMPLETED | OUTPATIENT
Start: 2020-06-14 | End: 2020-06-14

## 2020-06-14 RX ADMIN — FENTANYL CITRATE 100 MCG: 50 INJECTION, SOLUTION INTRAMUSCULAR; INTRAVENOUS at 10:14

## 2020-06-14 RX ADMIN — DEXAMETHASONE SODIUM PHOSPHATE 4 MG: 4 INJECTION, SOLUTION INTRA-ARTICULAR; INTRALESIONAL; INTRAMUSCULAR; INTRAVENOUS; SOFT TISSUE at 10:14

## 2020-06-14 RX ADMIN — OXYCODONE HYDROCHLORIDE 5 MG: 5 TABLET ORAL at 03:22

## 2020-06-14 RX ADMIN — SODIUM CHLORIDE, POTASSIUM CHLORIDE, SODIUM LACTATE AND CALCIUM CHLORIDE: 600; 310; 30; 20 INJECTION, SOLUTION INTRAVENOUS at 10:08

## 2020-06-14 RX ADMIN — HYDROMORPHONE HYDROCHLORIDE 0.5 MG: 1 INJECTION, SOLUTION INTRAMUSCULAR; INTRAVENOUS; SUBCUTANEOUS at 04:25

## 2020-06-14 RX ADMIN — SODIUM CHLORIDE: 9 INJECTION, SOLUTION INTRAVENOUS at 04:21

## 2020-06-14 RX ADMIN — ONDANSETRON 4 MG: 2 INJECTION INTRAMUSCULAR; INTRAVENOUS at 09:53

## 2020-06-14 RX ADMIN — PROPOFOL 200 MCG/KG/MIN: 10 INJECTION, EMULSION INTRAVENOUS at 10:14

## 2020-06-14 RX ADMIN — Medication 3 G: at 10:18

## 2020-06-14 RX ADMIN — SCOPALAMINE 1 PATCH: 1 PATCH, EXTENDED RELEASE TRANSDERMAL at 09:00

## 2020-06-14 RX ADMIN — HYDROMORPHONE HYDROCHLORIDE 0.5 MG: 1 INJECTION, SOLUTION INTRAMUSCULAR; INTRAVENOUS; SUBCUTANEOUS at 08:28

## 2020-06-14 RX ADMIN — LIDOCAINE HYDROCHLORIDE 50 MG: 10 INJECTION, SOLUTION EPIDURAL; INFILTRATION; INTRACAUDAL; PERINEURAL at 10:14

## 2020-06-14 RX ADMIN — DIPHENHYDRAMINE HYDROCHLORIDE 25 MG: 50 INJECTION, SOLUTION INTRAMUSCULAR; INTRAVENOUS at 10:18

## 2020-06-14 RX ADMIN — MIDAZOLAM 2 MG: 1 INJECTION INTRAMUSCULAR; INTRAVENOUS at 10:08

## 2020-06-14 RX ADMIN — PROPOFOL 180 MG: 10 INJECTION, EMULSION INTRAVENOUS at 10:14

## 2020-06-14 RX ADMIN — ONDANSETRON 4 MG: 2 INJECTION INTRAMUSCULAR; INTRAVENOUS at 10:14

## 2020-06-14 SDOH — HEALTH STABILITY: MENTAL HEALTH: HOW OFTEN DO YOU HAVE A DRINK CONTAINING ALCOHOL?: NEVER

## 2020-06-14 NOTE — OR NURSING
Discharge instructions completed with wife via telephone. Instructed to  Oxy and Senna at pharmacy. All questions from wife and patient were answered. Discharged home with wife.

## 2020-06-14 NOTE — PLAN OF CARE
"PRIMARY DIAGNOSIS: RENAL COLIC    OUTPATIENT/OBSERVATION GOALS TO BE MET BEFORE DISCHARGE  1. Pain Status: Pain free.    2. Tolerating adequate PO diet: Yes    3. Surgical Intervention planned: Urology consult in morning    4. Cleared by consultants (if involved): No    5. Return to near baseline physical activity: Yes    Discharge Planner Nurse   Safe discharge environment identified: Yes  Barriers to discharge: Yes       Entered by: Trinidad Leslie 06/13/2020 10:32 PM     Vitals are Temp: 97.4  F (36.3  C) Temp src: Oral BP: (!) 154/75 Pulse: 74   Resp: 20 SpO2: 91 %.  Patient is Alert and Oriented x4. They are independent with no assistive devices .  Pt is a Regular diet, then will be NPO at midnight (pt is aware). They are denying pain.  Pt states that when do occur, it is sharp in in left lower abdomen. Pt reported \"good\" amount of emesis (mostly of undigested food) upon arrival to unit. PA aware. Currently denies nausea. Instructed pt to call when in pain and/or nauseated. Educated patient that urine will be strained with each void. Pt verbalized understanding. Patient has Normal Saline 0.9% running at 150 mL per hour. Call light within reach. Will continue to monitor.     Please review provider order for any additional goals.   Nurse to notify provider when observation goals have been met and patient is ready for discharge.  "

## 2020-06-14 NOTE — DISCHARGE INSTRUCTIONS
GENERAL ANESTHESIA OR SEDATION ADULT DISCHARGE INSTRUCTIONS   SPECIAL PRECAUTIONS FOR 24 HOURS AFTER SURGERY    IT IS NOT UNUSUAL TO FEEL LIGHT-HEADED OR FAINT, UP TO 24 HOURS AFTER SURGERY OR WHILE TAKING PAIN MEDICATION.  IF YOU HAVE THESE SYMPTOMS; SIT FOR A FEW MINUTES BEFORE STANDING AND HAVE SOMEONE ASSIST YOU WHEN YOU GET UP TO WALK OR USE THE BATHROOM.    YOU SHOULD REST AND RELAX FOR THE NEXT 24 HOURS AND YOU MUST MAKE ARRANGEMENTS TO HAVE SOMEONE STAY WITH YOU FOR AT LEAST 24 HOURS AFTER YOUR DISCHARGE.  AVOID HAZARDOUS AND STRENUOUS ACTIVITIES.  DO NOT MAKE IMPORTANT DECISIONS FOR 24 HOURS.    DO NOT DRIVE ANY VEHICLE OR OPERATE MECHANICAL EQUIPMENT FOR 24 HOURS FOLLOWING THE END OF YOUR SURGERY.  EVEN THOUGH YOU MAY FEEL NORMAL, YOUR REACTIONS MAY BE AFFECTED BY THE MEDICATION YOU HAVE RECEIVED.    DO NOT DRINK ALCOHOLIC BEVERAGES FOR 24 HOURS FOLLOWING YOUR SURGERY.    DRINK CLEAR LIQUIDS (APPLE JUICE, GINGER ALE, 7-UP, BROTH, ETC.).  PROGRESS TO YOUR REGULAR DIET AS YOU FEEL ABLE.    YOU MAY HAVE A DRY MOUTH, A SORE THROAT, MUSCLES ACHES OR TROUBLE SLEEPING.  THESE SHOULD GO AWAY AFTER 24 HOURS.    CALL YOUR DOCTOR FOR ANY OF THE FOLLOWING:  SIGNS OF INFECTION (FEVER, GROWING TENDERNESS AT THE SURGERY SITE, A LARGE AMOUNT OF DRAINAGE OR BLEEDING, SEVERE PAIN, FOUL-SMELLING DRAINAGE, REDNESS OR SWELLING.    IT HAS BEEN OVER 8 TO 10 HOURS SINCE SURGERY AND YOU ARE STILL NOT ABLE TO URINATE (PASS WATER).     CYSTOSCOPY DISCHARGE INSTRUCTIONS  [facility] UROLOGY  [facility]  [phone]    YOU MAY GO BACK TO YOUR NORMAL DIET AND ACTIVITY, UNLESS YOUR DOCTOR TELLS YOU NOT TO.    FOR THE NEXT TWO DAYS, YOU MAY NOTICE:    SOME BLOOD IN YOUR URINE.  SOME BURNING WHEN YOU URINATE.  AN URGE TO URINATE MORE OFTEN.  BLADDER SPASMS.    THESE ARE NORMAL AFTER THE PROCEDURE.  THEY SHOULD GO AWAY AFTER A DAY OR TWO.  TO RELIEVE THESE PROBLEMS:     DRINK 6 TO 8 LARGE GLASSES OF WATER EACH DAY  (INCLUDES DRINKS AT MEALS).  THIS WILL HELP CLEAR THE URINE.    TAKE WARM BATHS TO RELIEVE PAIN AND BLADDER SPASMS.  DO NOT ADD ANYTHING TO THE BATH WATER.    YOUR DOCTOR MAY PRESCRIBE PAIN MEDICINE.  YOU MAY ALSO TAKE TYLENOL (ACETAMINOPHEN) FOR PAIN.    CALL YOUR SURGEON IF YOU HAVE:    A FEVER OVER 101 DEGREES.  CHECK YOUR TEMPERATURE UNDER YOUR TONGUE.    CHILLS.    FAILURE TO URINATE (NO URINE COMES OUT WHEN YOU TRY TO USE THE TOILET).  TRY SOAKING IN A BATHTUB FULL OF WARM WATER.  IF STILL NO URINE, CALL YOUR DOCTOR.    A LOT OF BLOOD IN THE URINE, OR BLOOD CLOTS LARGER THAN A NICKEL.      PAIN IN THE BACK OR BELLY AREA (ABDOMEN).    PAIN OR SPASMS THAT ARE NOT RELIEVED BY WARM TUB BATHS AND PAIN MEDICINE.      SEVERE PAIN, BURNING OR OTHER PROBLEMS WHILE PASSING URINE.    PAIN THAT GETS WORSE AFTER TWO DAYS.          EXTRACORPOREAL SHOCK LITHOTRIPSY (ESWL) DISCHARGE INSTRUCTIONS  [facility] UROLOGY  [facility]  [phone]      Your stone(s) has been fragmented into many tiny pieces, which must now pass in your urine.  Usually this process is uneventful.  Most fragments pass in the first one or two weeks, but some may continue to pass for three months or more.  Some pain or discomfort may accompany the passage of these fragments.    To aid in the passage of fragments, drink lots of fluid.  Aim for 2 liters of water daily for the next one to two weeks. Most patients will benefit from a continued high fluid intake and urine output indefinitely, even after the fragments are gone.  This helps prevent new stone formation.    Strain your urine.  Take the stone fragments to your urologist.  They will have them analyzed to help determine the cause of your stone(s).    You should walk around and resume every day activities.  Activity may help the stone fragments pass.  You should, however, avoid sports or really strenuous exercise for about one week, or at least until there is no more blood  in your urine.    You may resume regular diet.    Call your urologist if you have:  a. Persistent severe pain not relieved by oral medications.  b. Fever (over 101 ).  c. Persistent vomiting.    See your urologist as directed.  They will need to take an x-ray to check your progress.  Take your stone fragments to your follow-up appointment.      STENT INFORMATION/DISCHARGE INSTRUCTIONS  [facility] UROLOGY  [facility]  [phone]    During surgery, a stent may be placed in the ureter.  The ureter is the tube that drains urine from the kidney to the bladder.  The stent is placed to dilate (open) the ureter so stone fragments can pass easily through the ureter or to decrease ureteral swelling after surgery or to relieve an obstruction.      The stent is made of silicone.  The upper end of the stent curls in the kidney while the lower end rests in the bladder.    While the stent is in place you may experience the following symptoms:  Blood and/or small blood clots in the urine  Bladder spasms (frequency and urgency of urination)  Discomfort or aching in the back or side where the stent is  Burning or discomfort at the end of urine stream    To decrease these symptoms you should:  Take antispasmodic medication as prescribed (Detrol, Ditropan, etc.)  Drink plenty of fluids but avoid caffeine and citrus (include cranberry)  If you are having discomfort in back or side, decrease activity    Please call your physician or the physician on call if you experience:  Fever greater than 101 degrees  Severe pain not relieved by pain medication or rest    Please make an appointment for the removal of the stent according to your physician's instructions.

## 2020-06-14 NOTE — CONSULTS
Urology Consult History and Physical    Name: Sammy Pope    MRN: [id number]   YOB: 1955       We were asked to see Sammy Pope at the request of Cheri Abraham PA-C for evaluation and treatment of left ureteral stone.        Chief Complaint:   Left renal colic          History of Present Illness:   Sammy Pope is a 65 year old male who is being seen for evaluation of left ureteral stone. Presented 4 days ago with left flank pain to ED. Found to have 4 mm proximal ureteral stone. Was initially sent home for trial of passage, but returned to ED with severe left flank pain. Pain is sharp, and waxes and wanes. Having nausea and emesis in ED. No fevers/chills. No history of kidney stones or other urologic problems.           Past Medical History:     Past Medical History:   Diagnosis Date     Complication of anesthesia      PONV (postoperative nausea and vomiting)    CAD         Past Surgical History:   History reviewed. No pertinent surgical history.         Social History:     Former smoker  History of alcohol abuse, sober since 1981         Family History:   History reviewed. No pertinent family history.   No known family history of  problems         Allergies:   No Known Allergies         Medications:     Current Facility-Administered Medications   Medication     [Auto Hold] acetaminophen (TYLENOL) tablet 650 mg     ceFAZolin (ANCEF) 1 g vial to attach to  ml bag for ADULT or 50 ml bag for PEDS     ceFAZolin (ANCEF) intermittent infusion 3 g (pre-mix)     [Auto Hold] HYDROmorphone (PF) (DILAUDID) injection 0.5 mg     lactated ringers infusion     lidocaine (LMX4) cream     [Auto Hold] lidocaine (LMX4) cream     lidocaine 1 % 0.1-1 mL     [Auto Hold] lidocaine 1 % 0.1-1 mL     [Auto Hold] melatonin tablet 1 mg     [Auto Hold] naloxone (NARCAN) injection 0.1-0.4 mg     [Auto Hold] ondansetron (ZOFRAN-ODT) ODT tab 4 mg    Or     [Auto Hold] ondansetron (ZOFRAN) injection 4 mg      "[Auto Hold] oxyCODONE (ROXICODONE) tablet 5-10 mg     [Auto Hold] polyethylene glycol (MIRALAX) Packet 17 g     scopolamine (TRANSDERM) 72 hr patch 1 patch    And     scopolamine (TRANSDERM-SCOP) Patch in Place     [Auto Hold] senna-docusate (SENOKOT-S/PERICOLACE) 8.6-50 MG per tablet 1 tablet    Or     [Auto Hold] senna-docusate (SENOKOT-S/PERICOLACE) 8.6-50 MG per tablet 2 tablet     sodium chloride (PF) 0.9% PF flush 3 mL     sodium chloride (PF) 0.9% PF flush 3 mL     [Auto Hold] sodium chloride (PF) 0.9% PF flush 3 mL     [Auto Hold] sodium chloride (PF) 0.9% PF flush 3 mL     sodium chloride 0.9% infusion     [Auto Hold] tamsulosin (FLOMAX) capsule 0.4 mg             Review of Systems:    ROS: 10 point ROS neg other than the symptoms noted above in the HPI.          Physical Exam:     Patient Vitals for the past 24 hrs:   BP Temp Temp src Pulse Heart Rate Resp SpO2 Height Weight   06/14/20 0854 135/74 97  F (36.1  C) Temporal -- 56 -- (!) 88 % -- --   06/14/20 0318 (!) 149/62 96.9  F (36.1  C) Oral 68 -- 16 95 % -- --   06/13/20 2336 (!) 166/71 97.1  F (36.2  C) Oral 71 -- 18 94 % -- --   06/13/20 2156 (!) 154/75 -- -- 74 -- -- -- -- --   06/13/20 2114 (!) 182/80 97.4  F (36.3  C) Oral 78 -- 20 91 % 1.829 m (6' 0.01\") 122 kg (268 lb 14.4 oz)   06/13/20 2031 -- -- -- -- -- -- 97 % -- --   06/13/20 1916 -- -- -- -- -- -- 97 % -- --   06/13/20 1915 -- -- -- -- -- -- 95 % -- --   06/13/20 1900 -- -- -- 67 -- -- 93 % -- --   06/13/20 1845 -- -- -- -- -- -- 92 % -- --   06/13/20 1830 -- -- -- -- -- -- 97 % -- --   06/13/20 1815 -- -- -- -- -- -- 99 % -- --   06/13/20 1800 (!) 164/77 -- -- 64 -- -- 94 % -- --   06/13/20 1730 (!) 169/92 97.5  F (36.4  C) -- 100 -- 26 97 % -- --     General: age-appropriate appearing male in NAD  HEENT: Head AT/NC, EOMI, CN Grossly intact  Lungs: no respiratory distress, or pursed lip breathing  Heart: No obvious jugular venous distension present  Back: no bony midline tenderness, left " CVAT noted  Abdomen: soft, non-distended, non-tender. No organomegaly  : normal male external genitalia.   Lymph: no palpable inguinal lymphadenopathy.  LE: no edema. pneumoboots in place.  Musculoskeltal: extremities normal, no peripheral edema  Skin: no suspicious lesions or rashes  Neuro: Alert, oriented, speech and mentation normal;  moving all 4 extremities equally.  Psych: affect and mood normal          Data:   All laboratory data reviewed:    Recent Labs   Lab 06/14/20  0525 06/13/20  1751   WBC 8.8 8.8   HGB 11.9* 13.0*    203     Recent Labs   Lab 06/14/20  0525 06/13/20  1751 06/11/20  1059    137 139   POTASSIUM 3.9 4.0 4.1   CHLORIDE 107 104 108   CO2 25 23 20   BUN 19 20 23   CR 1.70* 1.85* 1.23   GLC 94 103* 124*   KENNEY 8.2* 9.1 9.3     Recent Labs   Lab 06/13/20  1751   COLOR Light Yellow   APPEARANCE Clear   URINEGLC Negative   URINEBILI Negative   URINEKETONE Negative   SG 1.016   URINEPH 5.5   PROTEIN Negative   NITRITE Negative   LEUKEST Negative   RBCU 0   WBCU 2     All pertinent imaging reviewed:    CT abd/pelvis 6/11/2020  IMPRESSION:   1.  3-4 mm proximal left ureteral calculus with mild left  hydronephrosis.  2.  Cholelithiasis.         Impression and Plan:   Impression:   65 year old male with obstructing 4 mm proximal left ureteral stone. Given multiple ED visits and ongoing severe renal colic, he elects for intervention rather than ongoing trial of passage. We discussed the options for treatment, including ureteroscopy with laser lithotripsy. We discussed the risks of this procedure, including bleeding, infection, pain, ureteral injury, and possible need for multiple procedures to address his stone burden. We also discussed likely need for ureteral stent following the procedure and possible stent-related discomfort. He expressed understanding and desire to proceed.      Plan:   - NPO  - To OR for cystoscopy, left ureteroscopy with laser lithotripsy and stone extraction with  possible left ureteral stent placement  - Anticipate discharge home following the procedure     Augie Rizo MD  Urology  [facility]  Clinic Phone [phone]

## 2020-06-14 NOTE — PLAN OF CARE
"PRIMARY DIAGNOSIS: RENAL COLIC    OUTPATIENT/OBSERVATION GOALS TO BE MET BEFORE DISCHARGE  1. Pain Status: 5/10 left lower abdominal pain    2. Tolerating adequate PO diet: Pt is NPO since midnight.    3. Surgical Intervention planned: Urology consult in morning and OR time of 1000.    4. Cleared by consultants (if involved): No    5. Return to near baseline physical activity: Yes    Discharge Planner Nurse   Safe discharge environment identified: Yes  Barriers to discharge: Yes       Entered by: Trinidad Leslie 06/14/2020 4:08 AM     Vitals are Temp: 96.9  F (36.1  C) Temp src: Oral BP: (!) 149/62 Pulse: 68   Resp: 16 SpO2: 95 %.  Patient is Alert and Oriented x4. They are independent with no assistive devices .  Pt is NPO at midnight (pt is aware). They are reporting 5/10 pain in left lower abdomen, oxycodone given, effectiveness pending.   Currently denies nausea. Instructed pt to call when in pain and/or nauseated. Straining urine. Patient has Normal Saline 0.9% running at 150 mL per hour. Call light within reach. Will continue to monitor.     Addendum: No change in pain with oxycodone, pain increased to 7/10 in left flank and left lower abdomen. IV diluadid given, pt stated pain is \"getting better\". Will continue to monitor.    Please review provider order for any additional goals.   Nurse to notify provider when observation goals have been met and patient is ready for discharge.  "

## 2020-06-14 NOTE — ED NOTES
Observation Brochure and Video   Patient informed of observation status based on provider's order. Observation Brochure was given and video watched.  Yaritza Washington RN

## 2020-06-14 NOTE — PHARMACY-ADMISSION MEDICATION HISTORY
Admission medication history interview status for this patient is complete. See [facility] admission navigator for allergy information, prior to admission medications and immunization status.     Medication history interview done via telephone during Covid-19 pandemic, indicate source(s): Patient's wife Tiffanie [phone]  Medication history resources (including written lists, pill bottles, clinic record): pill bottles    Changes made to PTA medication list:  Added: atorvastatin, aspirin, omega 3, multivitamin, glucosamine  Deleted: none  Changed: none    Actions taken by pharmacist (provider contacted, etc):None     Additional medication history information: Patient's wife Tiffanie would like to be updated on her 's condition - she states he did not bring his cell phone with him. She said he took tylenol at 4 pm today, and he hasn't taken his medications unrelated to the kidney stone since Thursday morning. He ran out of hydrocodone today at 1 pm.     Medication reconciliation/reorder completed by provider prior to medication history?  N   (Y/N)     For patients on insulin therapy: N  (Y/N)    Prior to Admission medications    Medication Sig Last Dose Taking? Auth Provider   atorvastatin (LIPITOR) 20 MG tablet Take 20 mg by mouth daily 6/11/2020 at am Yes Unknown, Entered By History   Glucosamine HCl (GLUCOSAMINE PO) Take 1 tablet by mouth 3 times daily 6/11/2020 at am Yes Unknown, Entered By History   HYDROcodone-acetaminophen (NORCO) 5-325 MG tablet Take 1 tablet by mouth every 6 hours as needed for breakthrough pain or moderate to severe pain 6/13/2020 at 1300 Yes Felix Odonnell MD   Multiple Vitamin (MULTIVITAMIN PO) Take 1 tablet by mouth daily 6/11/2020 at am Yes Unknown, Entered By History   Omega-3 1000 MG capsule Take 1 g by mouth 3 times daily  6/11/2020 at am Yes Unknown, Entered By History   tamsulosin (FLOMAX) 0.4 MG capsule Take 1 capsule (0.4 mg) by mouth daily for 10 doses 6/13/2020 at  am Yes Felix Odonnell MD   ondansetron (ZOFRAN ODT) 4 MG ODT tab Take 1 tablet (4 mg) by mouth every 8 hours as needed for nausea or vomiting 6/13/2020 at am  Felix Odonnell MD

## 2020-06-14 NOTE — PROGRESS NOTES
ROOM # 203  Living Situation (if not independent, order SW consult): Home w/ wife  Facility name:  : Spouse, Tiffanie    Activity level at baseline: Ind  Activity level on admit: Ind      Patient registered to observation; given Patient Bill of Rights; given the opportunity to ask questions about observation status and their plan of care.  Patient has been oriented to the observation room, bathroom and call light is in place.    Discussed discharge goals and expectations with patient/family.

## 2020-06-14 NOTE — ANESTHESIA PREPROCEDURE EVALUATION
Anesthesia Pre-Procedure Evaluation    Patient: Sammy Pope   MRN: [id number] : 1955          Preoperative Diagnosis: Left ureteral stone [N20.1]    Procedure(s):  CYSTOURETEROSCOPY, WITH RETROGRADE PYELOGRAM, HOLMIUM LASER LITHOTRIPSY OF URETERAL CALCULUS, AND STENT INSERTION    Past Medical History:   Diagnosis Date     Complication of anesthesia      PONV (postoperative nausea and vomiting)      History reviewed. No pertinent surgical history.  Anesthesia Evaluation     . Pt has had prior anesthetic. Type: General    History of anesthetic complications   - PONV        ROS/MED HX    ENT/Pulmonary:  - neg pulmonary ROS    (-) asthma   Neurologic:  - neg neurologic ROS     Cardiovascular:     (+) Dyslipidemia, ----. : . . . :. .      (-) syncope and irregular heartbeat/palpitations   METS/Exercise Tolerance:     Hematologic:  - neg hematologic  ROS       Musculoskeletal:  - neg musculoskeletal ROS       GI/Hepatic:  - neg GI/hepatic ROS      (-) GERD   Renal/Genitourinary:     (+) Nephrolithiasis ,       Endo:  - neg endo ROS       Psychiatric:         Infectious Disease:  - neg infectious disease ROS       Malignancy:         Other:                          Physical Exam  Normal systems: cardiovascular, pulmonary and dental    Airway   Mallampati: II  TM distance: >3 FB  Neck ROM: full    Dental     Cardiovascular       Pulmonary             Lab Results   Component Value Date    WBC 8.8 2020    HGB 11.9 (L) 2020    HCT 36.6 (L) 2020     2020     2020    POTASSIUM 3.9 2020    CHLORIDE 107 2020    CO2 25 2020    BUN 19 2020    CR 1.70 (H) 2020    GLC 94 2020    KENNEY 8.2 (L) 2020       Preop Vitals  BP Readings from Last 3 Encounters:   20 (!) 149/62   20 138/78    Pulse Readings from Last 3 Encounters:   20 68      Resp Readings from Last 3 Encounters:   20 16   20 24    SpO2 Readings from  "Last 3 Encounters:   06/14/20 95%   06/11/20 99%      Temp Readings from Last 1 Encounters:   06/14/20 96.9  F (36.1  C) (Oral)    Ht Readings from Last 1 Encounters:   06/13/20 1.829 m (6' 0.01\")      Wt Readings from Last 1 Encounters:   06/13/20 122 kg (268 lb 14.4 oz)    Estimated body mass index is 36.46 kg/m  as calculated from the following:    Height as of this encounter: 1.829 m (6' 0.01\").    Weight as of this encounter: 122 kg (268 lb 14.4 oz).       Anesthesia Plan      History & Physical Review  History and physical reviewed and following examination; no interval change.    ASA Status:  2 .    NPO Status:  > 8 hours    Plan for General with Intravenous induction. Maintenance will be Inhalation.    PONV prophylaxis:  Ondansetron (or other 5HT-3) and Dexamethasone or Solumedrol         Postoperative Care  Postoperative pain management:  IV analgesics.      Consents  Anesthetic plan, risks, benefits and alternatives discussed with:  Patient..                 Ovidio Burger MD                    .  "

## 2020-06-14 NOTE — DISCHARGE SUMMARY
[facility] Outpatient / Observation Unit  Discharge Summary        Sammy Pope MRN# [id number]   YOB: 1955 Age: 65 year old     Date of Admission:  6/13/2020  Date of Discharge:  6/14/2020  Admitting Physician:  Sheyla Yen MD  Discharge Physician: Marielle Garnica PA-C  Discharging Service: Hospitalist      Primary Provider: [facility]  Primary Care Physician Phone Number: None         Primary Discharge Diagnoses:    Sammy Pope is a 65 year old male with a PMH significant for CAD, HLP, former smoker and recovering alcoholic sober since 1981, who presents with complaints of L flank pain.    #Acute renal colic: known 3-4 mm stone diagnosed on 6/11 based on CT scan with KUB on 6/13 showing kidney stone still present. Stone not passed.  #S/p cystourethroscopy, left ureteroscopy with holmium laser lithotripsy and stone extraction, and left ureteral stent placement    #ARLEN: creatinine of 1.85 initially with improvement to 1.7 today. Baseline creatinine of 1.23. Will need BMP checked in outpatient setting to monitor for improvement.         Secondary Discharge Diagnoses:     Past Medical History:   Diagnosis Date     Complication of anesthesia      PONV (postoperative nausea and vomiting)             Code Status:      Full Code        Brief Hospital Summary:        Reason for your hospital stay      You were admitted for concerns of abdominal pain related to a kidney   stone. You were treated with fluids, flomax and pain/nausea control as   needed. You were seen by our urologists. They opted to take you to surgery   for stent placement and/or stone removal. Your urine did not look infected   so antibiotics were not prescribed.    We recommend you follow urology's instructions for diet and activity   level.    If you need to get a hold of [facility] for follow up please call   [phone]           Please refer to initial admission history and physical for further details.    Briefly, Sammy Pope was admitted on 6/13/2020 with acute renal colic. Initial work up in the ED did not reveal evidence of grossly infected stone or significant renal failure/ATN to require inpatient hospitalization. Pt was registered to the Observation Unit for pain control and supportive measures.     Pt was resuscitated with IVF, and anti-emetics. Urine was strained and stone likely not passed.   Urology consult was obtained and patient was taken for a procedure to have his stone removed.   Pain control was transitioned from IV to PO form. Labs reviewed and significant results addressed. On the day of discharge, pt's pain was controlled and was able to tolerate PO intake.  Medications were reviewed and adjustments made as necessary. Pt is instructed to follow up as below.           Significant Lab During Hospitalization:      Recent Labs   Lab 06/14/20 0525 06/13/20  1751   WBC 8.8 8.8   HGB 11.9* 13.0*   HCT 36.6* 39.3*   MCV 92 89    203     Recent Labs   Lab 06/14/20 0525 06/13/20  1751 06/11/20  1059    137 139   POTASSIUM 3.9 4.0 4.1   CHLORIDE 107 104 108   CO2 25 23 20   ANIONGAP 7 10 11   GLC 94 103* 124*   BUN 19 20 23   CR 1.70* 1.85* 1.23   GFRESTIMATED 42* 38* 62   GFRESTBLACK 48* 44* 71   KENNEY 8.2* 9.1 9.3     Recent Labs   Lab 06/13/20  1751   COLOR Light Yellow   APPEARANCE Clear   URINEGLC Negative   URINEBILI Negative   URINEKETONE Negative   SG 1.016   UBLD Negative   URINEPH 5.5   PROTEIN Negative   NITRITE Negative   LEUKEST Negative   RBCU 0   WBCU 2            Significant Imaging During Hospitalization:      Results for orders placed or performed during the hospital encounter of 06/13/20   KUB XR    Narrative    SUPINE ABDOMEN EXAM.  06/13/2020.    INDICATION: Recent diagnosis of 3 to 4 mm proximal left ureteral stone. Evaluate for movement of stone further down ureter.    COMPARISON STUDY: CT abdomen and pelvis 06/11/2020    FINDINGS: A proximal left ureteral stone is  not visualized. There are several calcifications within the pelvis most of which represent phleboliths. A 2 mm calcification lower left pelvis could represent migration of the previously detected left ureteral   stone. Moderate stool throughout the colon, bowel gas pattern otherwise unremarkable. Small bone island left femoral head.              Pending Results:      None        Consultations This Hospital Stay:      Consultation during this admission received from urology         Discharge Instructions and Follow-Up:      Follow-up Appointments     Follow-up and recommended labs and tests       Follow up with primary care provider, [facility], within   7 days for hospital follow- up.  The following labs/tests are recommended:   BMP.  Follow up with urology per their recommendations.               Discharge Disposition:      Discharged to home         Discharge Medications:        Discharge Medication List as of 6/14/2020 12:06 PM      START taking these medications    Details   oxyCODONE (ROXICODONE) 5 MG tablet Take 1-2 tablets (5-10 mg) by mouth every 4 hours as needed for moderate to severe pain, Disp-10 tablet,R-0, E-Prescribe      senna-docusate (SENOKOT-S/PERICOLACE) 8.6-50 MG tablet Take 1-2 tablets by mouth 2 times daily as needed for constipation (While on oral opioids.), Disp-20 tablet,R-0, E-Prescribe         CONTINUE these medications which have NOT CHANGED    Details   atorvastatin (LIPITOR) 20 MG tablet Take 20 mg by mouth daily, Historical      Glucosamine HCl (GLUCOSAMINE PO) Take 1 tablet by mouth 3 times daily, Historical      Multiple Vitamin (MULTIVITAMIN PO) Take 1 tablet by mouth daily, Historical      Omega-3 1000 MG capsule Take 1 g by mouth 3 times daily , Historical      tamsulosin (FLOMAX) 0.4 MG capsule Take 1 capsule (0.4 mg) by mouth daily for 10 doses, Disp-10 capsule,R-0, Local Print      ondansetron (ZOFRAN ODT) 4 MG ODT tab Take 1 tablet (4 mg) by mouth every 8  "hours as needed for nausea or vomiting, Disp-10 tablet,R-0, Local Print         STOP taking these medications       HYDROcodone-acetaminophen (NORCO) 5-325 MG tablet Comments:   Reason for Stopping:                   Allergies:       No Known Allergies        Condition and Physical on Discharge:      Discharge condition: Stable   Vitals: Blood pressure (!) 151/80, pulse 55, temperature 97.3  F (36.3  C), temperature source Temporal, resp. rate 16, height 1.829 m (6' 0.01\"), weight 122 kg (268 lb 14.4 oz), SpO2 92 %.  268 lbs 14.39 oz      GENERAL:  Comfortable.  PSYCH: pleasant, oriented, No acute distress.  HEENT:  PERRLA. Normal conjunctiva, normal hearing, nasal mucosa and oropharynx are normal.  NECK:  Supple, no neck vein distention, adenopathy or bruits, normal thyroid.  HEART:  Normal S1, S2 with no murmur, no pericardial rub, gallops or S3 or S4.  LUNGS:  Clear to auscultation, normal respiratory effort. No wheezing, rales or ronchi.  ABDOMEN:  Soft, no hepatosplenomegaly, normal bowel sounds. Non-tender, non distended.   EXTREMITIES:  No pedal edema, +2 radial and posterior pulses bilateral and equal.  SKIN:  Dry to touch, No rash, wound or ulcerations.  NEUROLOGIC:  CN 2-12 intact, BL 5/5 symmetric upper and lower extremity strength, sensation is intact with no focal deficits.     Marielle Garnica PA-C  "

## 2020-06-14 NOTE — ED NOTES
[facility]  ED Nurse Handoff Report    Sammy Pope is a 65 year old male   ED Chief complaint: Flank Pain  . ED Diagnosis:   Final diagnoses:   Ureteral stone   Acute kidney injury (H)     Allergies: No Known Allergies    Code Status: Full Code  Activity level - Baseline/Home:  Independent. Activity Level - Current:   Independent. Lift room needed: No. Bariatric: No   Needed: No   Isolation: No. Infection: Not Applicable.     Vital Signs:   Vitals:    06/13/20 1845 06/13/20 1900 06/13/20 1915 06/13/20 1916   BP:       Pulse:  67     Resp:       Temp:       SpO2: 92% 93% 95% 97%       Cardiac Rhythm:  ,      Pain level: 0-10 Pain Scale: 10  Patient confused: No. Patient Falls Risk: No.   Elimination Status: Has voided   Patient Report - Initial Complaint: Pt presents with continued left flank pain. Focused Assessment: Pt presents with continued left flank pain related to a kidney stone that was found 2 days ago in ED, since that time pt has been unable to control pain despite medications and kidney function has declined as well. Pt denies any past hx of kidney stones, appears very uncomfortable but is very pleasant despite discomfort.  Tests Performed: labs, abdominal x-ray. Abnormal Results:   Labs Ordered and Resulted from Time of ED Arrival Up to the Time of Departure from the ED   ROUTINE UA WITH MICROSCOPIC - Abnormal; Notable for the following components:       Result Value    Mucous Urine Present (*)     All other components within normal limits   BASIC METABOLIC PANEL - Abnormal; Notable for the following components:    Glucose 103 (*)     Creatinine 1.85 (*)     GFR Estimate 38 (*)     GFR Estimate If Black 44 (*)     All other components within normal limits   CBC WITH PLATELETS DIFFERENTIAL - Abnormal; Notable for the following components:    Hemoglobin 13.0 (*)     Hematocrit 39.3 (*)     All other components within normal limits   COVID-19 VIRUS (CORONAVIRUS) BY PCR     KUB  XR   Final Result          Treatments provided: dilaudid   Family Comments: n/a  OBS brochure/video discussed/provided to patient:  N/A  ED Medications:   Medications   ondansetron (ZOFRAN) injection 4 mg (4 mg Intravenous Given 6/13/20 1759)   0.9% sodium chloride BOLUS (0 mLs Intravenous Stopped 6/13/20 1947)   HYDROmorphone (PF) (DILAUDID) injection 0.5 mg (0.5 mg Intravenous Given 6/13/20 1829)   HYDROmorphone (PF) (DILAUDID) injection 0.5 mg (0.5 mg Intravenous Given 6/13/20 1947)     Drips infusing:  No  For the majority of the shift, the patient's behavior Green. Interventions performed were .    Sepsis treatment initiated: No       ED Nurse Name/Phone Number: Yaritza Washington RN,   7:54 PM    RECEIVING UNIT ED HANDOFF REVIEW    Above ED Nurse Handoff Report was reviewed: Yes  Reviewed by: Trinidad Leslie RN on June 13, 2020 at 8:54 PM

## 2020-06-14 NOTE — H&P
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  Work up in the ED reveals: VSS, BP moderately elevated. BMP shows ARLEN with Cr of 1.85, otherwise unremarkable. CBC is fairly unremarkable. CT abd/pelvis from 6/11 shows 3-4 mm L ureteral stone with mild hydronephrosis. UA today is unremarkable. KUB re-demonstrates kidney stone.   Patient will be registered to Observation for further evaluation and symptom management for acute renal colic.     1. Acute renal colic - 3-4 mm l ureteral stone, now with ARLEN, no evidence of infection. Dx with stone on 6/11, ran out of [place] today, pain uncontrolled. Will continue with aggressive IVFs, Flomax and consult Urology. Strain urine and pain control  2. ARLEN - Cr 1.85 today, previously 1.23. due to obstructing kidney stone. IVFs and recheck in AM  3. HLP - hold statin  4. Asymptomatic Covid-19 swab done for surgical planning due to current pandemic.          Plan:     1. [place] to Observation  2. Aggressive IV hydration with Normal saline, @, 150 ml/hr  3. Cont supportive care with anti-emetics and pain control (no NSAIDs)  4. Strain Urine, send stone for analysis if applicable  5. Initiate Flomax  6. Urology consultation  7. Regular diet, NPO at midnight  8. DVT prophylaxis: pt at low risk, encourage ambulation                  Chief Complaint:   Sammy Pope is a 65 year old male with a PMH significant for CAD, former smoker and recovering alcoholic sober since 1981, who presents with complaints of L flank pain. Pt reports intermittent left sided flank pain for the past week. It became severe on  Thursday, 6/11 so presented to the ED. He was diagnosed with a 3-4 mm left ureteral stone. Pain was improved in the ED so he was discharged home with Flomax and Norco. The Norco would improve the pain at times but not others. He states the pain has been more persistent since Thursday but still waxing and waning. He denies fever, chills, cough, chest pain, SOB, diarrhea, hematuria or dysuria. He denies nausea or vomiting until after receiving meds in the ED and vomited upon arrival to the floor. He denies hx of kidney stones.            Past Medical History:   CAD          Past Surgical History:   No significant PSHx          Social History:     Social History     Socioeconomic History     Marital status:      Spouse name: Not on file     Number of children: Not on file     Years of education: Not on file     Highest education level: Not on file   Occupational History     Not on file   Social Needs     Financial resource strain: Not on file     Food insecurity     Worry: Not on file     Inability: Not on file     Transportation needs     Medical: Not on file     Non-medical: Not on file   Tobacco Use     Smoking status: Not on file   Substance and Sexual Activity     Alcohol use: Not on file     Drug use: Not on file     Sexual activity: Not on file   Lifestyle     Physical activity     Days per week: Not on file     Minutes per session: Not on file     Stress: Not on file   Relationships     Social connections     Talks on phone: Not on file     Gets together: Not on file     Attends Mosque service: Not on file     Active member of club or organization: Not on file     Attends meetings of clubs or organizations: Not on file     Relationship status: Not on file     Intimate partner violence     Fear of current or ex partner: Not on file     Emotionally abused: Not on file     Physically abused: Not on file     Forced sexual activity: Not on file   Other Topics Concern     Not on file   Social History  Narrative     Not on file   former smoker   Alcoholic, sober since 1981            Family History:   Reviewed and non contributory         Allergies:    No Known Allergies            Medications:     Prior to Admission medications    Medication Sig Last Dose Taking? Auth Provider   atorvastatin (LIPITOR) 20 MG tablet Take 20 mg by mouth daily 6/11/2020 at am Yes Unknown, Entered By History   Glucosamine HCl (GLUCOSAMINE PO) Take 1 tablet by mouth 3 times daily 6/11/2020 at am Yes Unknown, Entered By History   HYDROcodone-acetaminophen (NORCO) 5-325 MG tablet Take 1 tablet by mouth every 6 hours as needed for breakthrough pain or moderate to severe pain 6/13/2020 at 1300 Yes Felix Odonnell MD   Multiple Vitamin (MULTIVITAMIN PO) Take 1 tablet by mouth daily 6/11/2020 at am Yes Unknown, Entered By History   Omega-3 1000 MG capsule Take 1 g by mouth 3 times daily  6/11/2020 at am Yes Unknown, Entered By History   tamsulosin (FLOMAX) 0.4 MG capsule Take 1 capsule (0.4 mg) by mouth daily for 10 doses 6/13/2020 at am Yes Felix Odonnell MD   ondansetron (ZOFRAN ODT) 4 MG ODT tab Take 1 tablet (4 mg) by mouth every 8 hours as needed for nausea or vomiting 6/13/2020 at am  Felix Odonnell MD              Review of Systems:   A Comprehensive greater than 10 system review of systems was carried out.  Pertinent positives and negatives are noted above.  Otherwise negative for contributory information.     Constitutional, neuro, ENT, endocrine, pulmonary, cardiac, gastrointestinal, genitourinary, musculoskeletal, integument and psychiatric systems are negative, except as otherwise noted.         Physical Exam:   Blood pressure (!) 164/77, pulse 67, temperature 97.5  F (36.4  C), resp. rate 26, SpO2 97 %.    GENERAL:  Comfortable.  PSYCH: pleasant, oriented, No acute distress.  HEENT:  Atraumatic, normocephalic. Normal conjunctiva, normal hearing, and oropharynx is normal.  NECK:  Supple, no neck vein  distention  HEART:  Normal S1, S2 with no murmur, no pericardial rub, gallops or S3 or S4.  LUNGS:  Clear to auscultation, normal Respiratory effort. No wheezing, rales or ronchi.  GI:  Soft, normal bowel sounds. Mild LLQ abd tenderness, no CVA tenderness, non distended.   EXTREMITIES:  No pedal edema, +2 pulses bilateral and equal.  SKIN:  Dry to touch, No rash, wound or ulcerations.  NEUROLOGIC:  CN 2-12 intact, BL 5/5 symmetric upper and lower extremity strength, sensation is intact with no focal deficits.  .            Data:     Recent Labs   Lab 06/13/20  1751   WBC 8.8   HGB 13.0*   HCT 39.3*   MCV 89        Recent Labs   Lab 06/13/20  1751 06/11/20  1059    139   POTASSIUM 4.0 4.1   CHLORIDE 104 108   CO2 23 20   ANIONGAP 10 11   * 124*   BUN 20 23   CR 1.85* 1.23   GFRESTIMATED 38* 62   GFRESTBLACK 44* 71   KENNEY 9.1 9.3     Recent Labs   Lab 06/13/20  1751   COLOR Light Yellow   APPEARANCE Clear   URINEGLC Negative   URINEBILI Negative   URINEKETONE Negative   SG 1.016   UBLD Negative   URINEPH 5.5   PROTEIN Negative   NITRITE Negative   LEUKEST Negative   RBCU 0   WBCU 2         Recent Results (from the past 48 hour(s))   KUB XR    Narrative    SUPINE ABDOMEN EXAM.  06/13/2020.    INDICATION: Recent diagnosis of 3 to 4 mm proximal left ureteral stone. Evaluate for movement of stone further down ureter.    COMPARISON STUDY: CT abdomen and pelvis 06/11/2020    FINDINGS: A proximal left ureteral stone is not visualized. There are several calcifications within the pelvis most of which represent phleboliths. A 2 mm calcification lower left pelvis could represent migration of the previously detected left ureteral   stone. Moderate stool throughout the colon, bowel gas pattern otherwise unremarkable. Small bone island left femoral head.       Cheri Abraham PA-C

## 2020-06-14 NOTE — ANESTHESIA POSTPROCEDURE EVALUATION
Patient: Sammy Pope    Procedure(s):  CYSTOURETEROSCOPY, WITH RETROGRADE PYELOGRAM, HOLMIUM LASER LITHOTRIPSY OF URETERAL CALCULUS, AND STENT INSERTION - left    Diagnosis:Left ureteral stone [N20.1]  Diagnosis Additional Information: No value filed.    Anesthesia Type:  General    Note:  Anesthesia Post Evaluation    Patient location during evaluation: PACU  Patient participation: Able to fully participate in evaluation  Level of consciousness: awake and alert  Pain management: adequate  Airway patency: patent  Cardiovascular status: acceptable  Respiratory status: acceptable  Hydration status: acceptable  PONV: controlled             Last vitals:  Vitals:    06/14/20 1158 06/14/20 1200 06/14/20 1231   BP: (!) 150/70 (!) 150/70 (!) 151/80   Pulse:  55    Resp: 15 19 16   Temp: 97.1  F (36.2  C)  97.3  F (36.3  C)   SpO2: 94% 92% 92%         Electronically Signed By: Ovidio Burger MD  June 14, 2020  12:46 PM

## 2020-06-14 NOTE — ANESTHESIA CARE TRANSFER NOTE
Patient: Sammy Pope    Procedure(s):  CYSTOURETEROSCOPY, WITH RETROGRADE PYELOGRAM, HOLMIUM LASER LITHOTRIPSY OF URETERAL CALCULUS, AND STENT INSERTION - left    Diagnosis: Left ureteral stone [N20.1]  Diagnosis Additional Information: No value filed.    Anesthesia Type:   General     Note:  Airway :Face Mask  Patient transferred to:PACU  Handoff Report: Identifed the Patient, Identified the Reponsible Provider, Reviewed the pertinent medical history, Discussed the surgical course, Reviewed Intra-OP anesthesia mangement and issues during anesthesia, Set expectations for post-procedure period and Allowed opportunity for questions and acknowledgement of understanding      Vitals: (Last set prior to Anesthesia Care Transfer)    CRNA VITALS  6/14/2020 1033 - 6/14/2020 1133      6/14/2020             Pulse:  63    SpO2:  99 %    Resp Rate (observed):  (!) 2                Electronically Signed By: GABE Serrano CRNA  June 14, 2020  11:49 AM

## 2020-06-14 NOTE — ED NOTES
Pt pacing around in room, visibly uncomfortable, writer asked if he needed more pain medication and pt was very receptive. MD advised for another dilaudid order, verbal order given. Dilaudid pulled and given, pt rates pain 10/10

## 2020-06-14 NOTE — PLAN OF CARE
PRIMARY DIAGNOSIS: RENAL COLIC    OUTPATIENT/OBSERVATION GOALS TO BE MET BEFORE DISCHARGE  1. Pain Status: 3/10 left lower abdominal pain    2. Tolerating adequate PO diet: Pt is NPO since midnight.    3. Surgical Intervention planned: Urology consult in morning and OR time of 1000.    4. Cleared by consultants (if involved): No    5. Return to near baseline physical activity: Yes    Discharge Planner Nurse   Safe discharge environment identified: Yes  Barriers to discharge: Yes       Entered by: Trinidad Leslie 06/14/2020 12:32 AM     Vitals are Temp: 97.1  F (36.2  C) Temp src: Oral BP: (!) 166/71 Pulse: 71   Resp: 18 SpO2: 94 %.  Patient is Alert and Oriented x4. They are independent with no assistive devices .  Pt is NPO at midnight (pt is aware). They are reporting 3/10 pain in left lower abdomen, oxycodone given, effectiveness pending.   Currently denies nausea. Instructed pt to call when in pain and/or nauseated. Educated patient that urine will be strained with each void. Pt verbalized understanding. Patient has Normal Saline 0.9% running at 150 mL per hour. Call light within reach. Will continue to monitor.     Please review provider order for any additional goals.   Nurse to notify provider when observation goals have been met and patient is ready for discharge.

## 2020-06-14 NOTE — OP NOTE
DATE OF SERVICE: 6/14/2020  PREOPERATIVE DIAGNOSIS: Left nephrolithiasis  POSTOPERATIVE DIAGNOSIS: Left nephrolithiasis    PROCEDURES PERFORMED:   1. Cystourethroscopy  2. Left ureteroscopy with holmium laser lithotripsy and stone extraction  3. Left retrograde pyelography with interpretation of intraoperative fluoroscopic imaging  4. Left ureteral stent placement    STAFF SURGEON: Augie Rizo MD  ANESTHESIA: General    ESTIMATED BLOOD LOSS: 1 cc  DRAINS/TUBES: Left 6 Greek x 26 cm double-J ureteral stent   COMPLICATIONS: None.   SPECIMEN: Stone for analysis  SIGNIFICANT FINDINGS: 4 mm proximal ureteral stone on left side. Fragmented and removed. Stone free at end of case.    BRIEF OPERATIVE INDICATIONS: Sammy Pope is a 65 year old male who recently presented with left ureteral calculi. After a discussion of all risks, benefits, and alternatives, the patient elected to proceed with definitive stone management. The patient understands the potential need for more than one procedure to eliminate all stone burden.      DESCRIPTION OF PROCEDURE:  After informed consent was verified, the patient was transported to the operating room, placed supine on the table. After adequate anesthesia was induced, he was placed in dorsal lithotomy and prepped and draped in the usual sterile fashion. A timeout was taken to confirm correct patient, procedure and laterality. Pre-operative IV antibiotics were administered.    A 22 Greek rigid cystoscope was inserted per a well-lubricated urethra. The anterior urethra was unremarkable. The ureteral orifices were orthotopic bilaterally. The left ureteral orifice was identified and cannulated with a Sensor wire and 6-Fr open-ended catheter. The wire passed without resistance into the upper pole.  A retrograde pyelogram showed proximal evidence of ureteral stone, moderate hydronephrosis, but no other filling defects. Semirigid scope advanced to proximal ureter, but unable to reach the  stone. The scope was used to establish access with a second, Super Stiff wire and removed. Ureteral access sheath was advanced into the left proximal ureter. A flexible ureteroscope was advanced under direct vision into the renal pelvis. A single 4 mm stone was noted in the upper pole of the kidney, having been pushed up by the initial wire passage. A 200 micron laser fiber at settings of 0.8 J and 8 Hz was used to fragment the stones. All fragments >1mm were basketed and withdrawn.  Pullback ureteroscopy was performed and showed no retained stone fragments or ureteral injury. A 6 Fr x 26 cm double-J stent was advanced over the Sensor wire, and a good proximal curl was seen in the renal pelvis and the distal curl was seen in the bladder. The bladder was drained.   The patient tolerated the procedure well.  No apparent complications. He was transported to the postanesthesia care unit in stable condition.      PLAN: Return in 1-2 weeks for cystoscopy and ureteral stent removal in the office.    Augie Rizo MD  Urology  [facility]  Clinic Phone [phone]

## 2020-06-15 LAB
COPATH REPORT: NORMAL
INTERPRETATION ECG - MUSE: NORMAL

## 2020-06-18 ENCOUNTER — NURSE TRIAGE (OUTPATIENT)
Dept: NURSING | Facility: CLINIC | Age: 65
End: 2020-06-18

## 2020-06-18 ENCOUNTER — TELEPHONE (OUTPATIENT)
Dept: UROLOGY | Facility: CLINIC | Age: 65
End: 2020-06-18

## 2020-06-18 DIAGNOSIS — N20.0 CALCULUS OF KIDNEY: ICD-10-CM

## 2020-06-18 DIAGNOSIS — N32.89 BLADDER SPASM: Primary | ICD-10-CM

## 2020-06-18 LAB
APPEARANCE STONE: NORMAL
COMPN STONE: NORMAL
NUMBER STONE: 3
SIZE STONE: NORMAL MM
WT STONE: 36 MG

## 2020-06-18 RX ORDER — OXYBUTYNIN CHLORIDE 5 MG/1
5 TABLET, EXTENDED RELEASE ORAL DAILY
Qty: 8 TABLET | Refills: 0 | Status: SHIPPED | OUTPATIENT
Start: 2020-06-18

## 2020-06-18 NOTE — TELEPHONE ENCOUNTER
Sten in place  Having some frequency and discomfort after urination. Will check with Dr Rizo if we can add oxybutynin 5 mg ER to take daily until stent is removed .Jane Gaytan LPN

## 2020-06-18 NOTE — TELEPHONE ENCOUNTER
Rx sent in for Ditropan. Common side effects reviewed  With patient  Including  Constipation, retention and dry mouth .Jane Gaytan LPN

## 2020-06-18 NOTE — TELEPHONE ENCOUNTER
Sammy had a kidney removed and stent placed on Sunday.  Today is still getting pain on front after urinating.  Pain is becoming severe.  FNA advised to phone Urology and Sammy agreed.      Additional Information    Negative: Nursing judgment, per information in Reference    Negative: Information only call about a Well Adult (no illness or injury)    Negative: Nursing judgment or information in reference    Negative: Nursing judgment or information in reference    Negative: Nursing judgment or information in reference    Negative: Nursing judgment or information in reference    Nursing judgment or information in reference    Protocols used: NO GUIDELINE [id number]      
29-Aug-2019 03:20

## 2020-06-18 NOTE — TELEPHONE ENCOUNTER
Health Call Center    Phone Message    May a detailed message be left on voicemail: yes     Reason for Call: Symptoms or Concerns     If patient has red-flag symptoms, warm transfer to triage line    Current symptom or concern: Pt say stent is now running clear but he is experiencing a lot of pain with urination. The pain occurs in the frontal groin area.    Pt requests staff call to discuss    Symptoms have been present for:  1 day(s)    Has patient previously been seen for this? Yes    By Augie Rizo MD        Are there any new or worsening symptoms? Yes: pain      Action Taken: Message routed to:  Other: urology    Travel Screening: Not Applicable

## 2020-06-24 ENCOUNTER — TELEPHONE (OUTPATIENT)
Dept: UROLOGY | Facility: CLINIC | Age: 65
End: 2020-06-24

## 2020-06-24 NOTE — TELEPHONE ENCOUNTER
Called to screen for CODVID-19 symptoms prior to tomorrow's visit.  Patient does not have any symptoms at this time.    Blanche Bolanos, EMT

## 2020-06-25 ENCOUNTER — OFFICE VISIT (OUTPATIENT)
Dept: UROLOGY | Facility: CLINIC | Age: 65
End: 2020-06-25
Payer: COMMERCIAL

## 2020-06-25 VITALS
SYSTOLIC BLOOD PRESSURE: 146 MMHG | WEIGHT: 250 LBS | HEIGHT: 72 IN | DIASTOLIC BLOOD PRESSURE: 82 MMHG | BODY MASS INDEX: 33.86 KG/M2

## 2020-06-25 DIAGNOSIS — N20.0 CALCULUS OF KIDNEY: Primary | ICD-10-CM

## 2020-06-25 DIAGNOSIS — Z79.2 PROPHYLACTIC ANTIBIOTIC: ICD-10-CM

## 2020-06-25 PROCEDURE — 52310 CYSTOSCOPY AND TREATMENT: CPT | Performed by: UROLOGY

## 2020-06-25 RX ORDER — LIDOCAINE HYDROCHLORIDE 20 MG/ML
JELLY TOPICAL ONCE
Status: COMPLETED | OUTPATIENT
Start: 2020-06-25 | End: 2020-06-25

## 2020-06-25 RX ORDER — CIPROFLOXACIN 500 MG/1
500 TABLET, FILM COATED ORAL ONCE
Qty: 1 TABLET | Refills: 0 | Status: SHIPPED | OUTPATIENT
Start: 2020-06-25 | End: 2020-06-25

## 2020-06-25 RX ADMIN — LIDOCAINE HYDROCHLORIDE: 20 JELLY TOPICAL at 13:17

## 2020-06-25 ASSESSMENT — MIFFLIN-ST. JEOR: SCORE: 1961.99

## 2020-06-25 ASSESSMENT — PAIN SCALES - GENERAL: PAINLEVEL: NO PAIN (0)

## 2020-06-25 NOTE — NURSING NOTE
Chief Complaint   Patient presents with     Ureteral stone     Here for stent removal     Prior to the start of the procedure and with procedural staff participation, I verbally confirmed the patient s identity using two indicators, relevant allergies, that the procedure was appropriate and matched the consent or emergent situation, and that the correct equipment/implants were available. Immediately prior to starting the procedure I conducted the Time Out with the procedural staff and re-confirmed the patient s name, procedure, and site/side. I have wiped the patient off with the povidone-Iodine solution, draped them, and used Lidocaine hydrochloride jelly. (The Joint Commission universal protocol was followed.)  Yes    Sedation (Moderate or Deep): None    5mL 2% lidocaine hydrochloride Urojet instilled into urethra.    NDC# [address]-48  Lot #: [id number]  Expiration Date:  01/22    Blanche Bolanos, EMT

## 2020-06-25 NOTE — PATIENT INSTRUCTIONS
"AFTER YOUR CYSTOSCOPY AND STENT REMOVAL  ?  ?  You have just completed a cystoscopy, or \"cysto\", which allowed your physician to learn more about your bladder (or to remove a stent placed after surgery). We suggest that you continue to avoid caffeine, fruit juice, and alcohol for the next 24 hours, however, you are encouraged to return to your normal activities.  ?  ?  A few things that are considered normal after your cystoscopy:  ?  * small amount of bleeding (or spotting) that clears within the next 24 hours  ?  * slight burning sensation with urination  ?  * sensation of needing to void (urinate) more frequently  ?  * the feeling of \"air\" in your urine  ?  * mild discomfort that is relieved with Tylenol    * bladder spasms  ?  ?  ?  Please contact our office promptly if you:  ?  * develop a fever above 101 degrees  ?  * are unable to urinate  ?  * develop bright red blood that does not stop  ?  * experience severe pain or swelling  ?  ?  ?  And of course, please contact our office with any concerns or questions [phone]  ?      AFTER YOUR CYSTOSCOPY        You have just completed a cystoscopy, or \"cysto\", which allowed your physician to learn more about your bladder (or to remove a stent placed after surgery). We suggest that you continue to avoid caffeine, fruit juice, and alcohol for the next 24 hours, however, you are encouraged to return to your normal activities.         A few things that are considered normal after your cystoscopy:     * Small amount of bleeding (or spotting) that clears within the next 24 hours     * Slight burning sensation with urination     * Sensation to of needing to avoid more frequently     * The feeling of \"air\" in your urine     * Mild discomfort that is relieved with Tylenol        Please contact our office promptly if you:     * Develop a fever above 101 degrees     * Are unable to urinate     * Develop bright red blood that does not stop     * Severe pain or " swelling         Please contact our office with any concerns or questions @[facility].

## 2020-06-25 NOTE — PROGRESS NOTES
OFFICE CYSTOSCOPY 6/25/2020    Sammy Pope is a 65 year old male with an indwelling ureteral stent in need of removal.    Calculi composed primarily of   calcium oxalate monohydrate.     CYSTOSCOPY PROCEDURE:  After sterile preparation and draping of the patient,  a 17-Yoruba flexible cystoscope was introduced via the urethra.  It was passed without difficulty into the bladder.  The urethra was open without evidence of stricture.  The ureteral orifices were orthotopic.  The double J stent was seen coming out the left side.  It was grasped with an alligator forceps and extracted intact without difficulty.  The patient tolerated the procedure well    A/P Successful stent removal  Prophylactic antibiotic ordered  Stone prevention counseling provided today    Watch for any new onset fevers, signs of UTI.  May expect some pain after removal.  If this is severe, or last many hours, you may need to return for replacement of stent.    Augie Rizo MD  Urology  [facility]

## 2020-06-25 NOTE — LETTER
6/25/2020      RE: Sammy Pope  [id number] [address] [facility] [address]       OFFICE CYSTOSCOPY 6/25/2020    Sammy Pope is a 65 year old male with an indwelling ureteral stent in need of removal.    Calculi composed primarily of   calcium oxalate monohydrate.     CYSTOSCOPY PROCEDURE:  After sterile preparation and draping of the patient,  a 17-Chadian flexible cystoscope was introduced via the urethra.  It was passed without difficulty into the bladder.  The urethra was open without evidence of stricture.  The ureteral orifices were orthotopic.  The double J stent was seen coming out the left side.  It was grasped with an alligator forceps and extracted intact without difficulty.  The patient tolerated the procedure well    A/P Successful stent removal  Prophylactic antibiotic ordered  Stone prevention counseling provided today    Watch for any new onset fevers, signs of UTI.  May expect some pain after removal.  If this is severe, or last many hours, you may need to return for replacement of stent.    Augie Rizo MD  Urology  [facility]

## (undated) DEVICE — BASKET NITINOL TIPLESS HALO  1.5FRX120CM 554120

## (undated) DEVICE — RAD RX ISOVUE 300 (50ML) 61% IOPAMIDOL CHARGE PER ML

## (undated) DEVICE — GLOVE PROTEXIS POWDER FREE SMT 7.5  2D72PT75X

## (undated) DEVICE — PAD CHUX UNDERPAD 23X24" 7136

## (undated) DEVICE — GUIDEWIRE SENSOR DUAL FLEX STR 0.035"X150CM M0066703080

## (undated) DEVICE — LINEN FULL SHEET 5511

## (undated) DEVICE — TUBING IRRIG TUR Y TYPE 96" LF 6543-01

## (undated) DEVICE — LINEN HALF SHEET 5512

## (undated) DEVICE — CATH URETERAL OPEN END 6FR AXXCESS

## (undated) DEVICE — SOL WATER IRRIG 1000ML BOTTLE 2F7114

## (undated) DEVICE — PACK CYSTO CUSTOM RIDGES

## (undated) DEVICE — BAG CLEAR TRASH 1.3M 39X33" P4040C

## (undated) DEVICE — WIRE GUIDE AMPLATZ SUPER STIFF 0.035"X145CM 46-524

## (undated) DEVICE — COVER FOOTSWITCH W/CINCH 20X24" 923267

## (undated) DEVICE — Device

## (undated) DEVICE — SOL NACL 0.9% IRRIG 3000ML BAG 2B7477

## (undated) DEVICE — PREP TECHNI-CARE CHLOROXYLENOL 3% 4OZ BOTTLE C222-4ZWO

## (undated) DEVICE — FLEXIVA 200

## (undated) RX ORDER — DEXAMETHASONE SODIUM PHOSPHATE 4 MG/ML
INJECTION, SOLUTION INTRA-ARTICULAR; INTRALESIONAL; INTRAMUSCULAR; INTRAVENOUS; SOFT TISSUE
Status: DISPENSED
Start: 2020-06-14

## (undated) RX ORDER — LIDOCAINE HYDROCHLORIDE 10 MG/ML
INJECTION, SOLUTION EPIDURAL; INFILTRATION; INTRACAUDAL; PERINEURAL
Status: DISPENSED
Start: 2020-06-14

## (undated) RX ORDER — PROPOFOL 10 MG/ML
INJECTION, EMULSION INTRAVENOUS
Status: DISPENSED
Start: 2020-06-14

## (undated) RX ORDER — FENTANYL CITRATE 50 UG/ML
INJECTION, SOLUTION INTRAMUSCULAR; INTRAVENOUS
Status: DISPENSED
Start: 2020-06-14

## (undated) RX ORDER — ONDANSETRON 2 MG/ML
INJECTION INTRAMUSCULAR; INTRAVENOUS
Status: DISPENSED
Start: 2020-06-14

## (undated) RX ORDER — SCOLOPAMINE TRANSDERMAL SYSTEM 1 MG/1
PATCH, EXTENDED RELEASE TRANSDERMAL
Status: DISPENSED
Start: 2020-06-14

## (undated) RX ORDER — CEFAZOLIN SODIUM IN 0.9 % NACL 3 G/100 ML
INTRAVENOUS SOLUTION, PIGGYBACK (ML) INTRAVENOUS
Status: DISPENSED
Start: 2020-06-14